# Patient Record
Sex: MALE | Race: WHITE | HISPANIC OR LATINO | Employment: OTHER | URBAN - METROPOLITAN AREA
[De-identification: names, ages, dates, MRNs, and addresses within clinical notes are randomized per-mention and may not be internally consistent; named-entity substitution may affect disease eponyms.]

---

## 2023-04-03 DIAGNOSIS — I10 PRIMARY HYPERTENSION: Primary | ICD-10-CM

## 2023-04-03 RX ORDER — LISINOPRIL AND HYDROCHLOROTHIAZIDE 12.5; 2 MG/1; MG/1
TABLET ORAL
Qty: 90 TABLET | Refills: 0 | Status: SHIPPED | OUTPATIENT
Start: 2023-04-03 | End: 2023-06-29 | Stop reason: SDUPTHER

## 2023-04-03 RX ORDER — LISINOPRIL AND HYDROCHLOROTHIAZIDE 12.5; 2 MG/1; MG/1
TABLET ORAL
COMMUNITY
End: 2023-04-03 | Stop reason: SDUPTHER

## 2023-05-12 DIAGNOSIS — E78.1 HYPERTRIGLYCERIDEMIA: Primary | ICD-10-CM

## 2023-05-12 DIAGNOSIS — I10 PRIMARY HYPERTENSION: ICD-10-CM

## 2023-05-15 RX ORDER — LISINOPRIL AND HYDROCHLOROTHIAZIDE 12.5; 2 MG/1; MG/1
TABLET ORAL
Qty: 90 TABLET | Refills: 0 | OUTPATIENT
Start: 2023-05-15

## 2023-05-15 NOTE — TELEPHONE ENCOUNTER
Medication refused due to failing protocol.    Requested Prescriptions   Pending Prescriptions Disp Refills    lisinopriL-hydrochlorothiazide 20-12.5 mg tablet [Pharmacy Med Name: Lisinopril-hydroCHLOROthiazide Oral Tablet 20-12.5 MG] 90 tablet 0     Sig: TAKE 1 & 1/2 TABLETS BY MOUTH DAILY       ACE Inhibitors Protocol Failed - 5/12/2023 12:24 PM        Failed - Medication not refilled in past 45 days (1.5 months)     Matching medication order placed on 4/3/2023 10:41 AM  Order [id number]: lisinopriL-hydrochlorothiazide 20-12.5 mg tablet  (For orders placed between 3/31/2023 12:04 PM and 5/15/2023 12:04 PM)            Failed - Normal serum creatinine in past 6 months     Creatinine   Date Value Ref Range Status   02/22/2022 1.38 (H) 0.50 - 1.30 mg/dL Final             Failed - Normal serum potassium in past 6 months     Potassium   Date Value Ref Range Status   02/22/2022 3.9 3.5 - 5.3 mmol/L Final             Passed - Visit with relevant provider in past 12 months or upcoming 90 days     Recent Visits  No visits were found meeting these conditions.  Showing recent visits within past 365 days and meeting all other requirements  Future Appointments  Date Type Provider Dept   06/05/23 Appointment Waylon Nogueira DO Do [id number]   Showing future appointments within next 90 days and meeting all other requirements              Passed - Blood pressure on record in past 6 months     BP Readings from Last 3 Encounters:   03/01/23 116/76   11/07/22 154/81   05/24/22 120/72               Passed - No ARBs prescribed in past 30 days       Diuretics Protocol Failed - 5/12/2023 12:24 PM        Failed - Normal serum potassium in past 12 months     Potassium   Date Value Ref Range Status   02/22/2022 3.9 3.5 - 5.3 mmol/L Final             Failed - Normal serum sodium in past 12 months     Sodium   Date Value Ref Range Status   02/22/2022 140 136 - 145 mmol/L Final             Failed - Medication not refilled in past 45 days  (1.5 months)     Matching medication order placed on 4/3/2023 10:41 AM  Order [id number]: lisinopriL-hydrochlorothiazide 20-12.5 mg tablet  (For orders placed between 3/31/2023 12:04 PM and 5/15/2023 12:04 PM)            Failed - Normal serum creatinine in past 12 months     Creatinine   Date Value Ref Range Status   02/22/2022 1.38 (H) 0.50 - 1.30 mg/dL Final             Passed - Visit with relevant provider in past 12 months or upcoming 90 days     Recent Visits  No visits were found meeting these conditions.  Showing recent visits within past 365 days and meeting all other requirements  Future Appointments  Date Type Provider Dept   06/05/23 Appointment Waylon Nogueira DO Do [id number]   Showing future appointments within next 90 days and meeting all other requirements              Passed - Weight on record in past 6 months        Passed - Blood pressure on record     BP Readings from Last 3 Encounters:   03/01/23 116/76   11/07/22 154/81   05/24/22 120/72

## 2023-05-16 PROBLEM — G47.11 IDIOPATHIC HYPERSOMNIA: Status: ACTIVE | Noted: 2023-05-16

## 2023-05-16 PROBLEM — M54.2 NECK PAIN: Status: ACTIVE | Noted: 2023-05-16

## 2023-05-16 PROBLEM — G47.30 SLEEP APNEA: Status: ACTIVE | Noted: 2023-05-16

## 2023-05-16 PROBLEM — Z86.0100 HISTORY OF COLON POLYPS: Status: ACTIVE | Noted: 2023-05-16

## 2023-05-16 PROBLEM — K21.9 GASTROESOPHAGEAL REFLUX DISEASE: Status: ACTIVE | Noted: 2023-05-16

## 2023-05-16 PROBLEM — R13.10 DYSPHAGIA: Status: ACTIVE | Noted: 2023-05-16

## 2023-05-16 PROBLEM — H90.5 SENSORINEURAL HEARING LOSS: Status: ACTIVE | Noted: 2023-05-16

## 2023-05-16 PROBLEM — Z86.010 HISTORY OF COLON POLYPS: Status: ACTIVE | Noted: 2023-05-16

## 2023-05-16 PROBLEM — K22.70 BARRETT ESOPHAGUS: Status: ACTIVE | Noted: 2023-05-16

## 2023-05-16 PROBLEM — H35.362: Status: ACTIVE | Noted: 2023-05-16

## 2023-05-16 PROBLEM — R73.01 IMPAIRED FASTING GLUCOSE: Status: ACTIVE | Noted: 2023-05-16

## 2023-05-16 PROBLEM — N18.31 STAGE 3A CHRONIC KIDNEY DISEASE (MULTI): Status: ACTIVE | Noted: 2023-05-16

## 2023-05-16 PROBLEM — I10 HYPERTENSION: Status: ACTIVE | Noted: 2023-05-16

## 2023-05-16 PROBLEM — E55.9 VITAMIN D DEFICIENCY: Status: ACTIVE | Noted: 2023-05-16

## 2023-05-16 PROBLEM — J44.9 COPD (CHRONIC OBSTRUCTIVE PULMONARY DISEASE) (MULTI): Status: ACTIVE | Noted: 2023-05-16

## 2023-05-16 PROBLEM — N52.9 ERECTILE DYSFUNCTION: Status: ACTIVE | Noted: 2023-05-16

## 2023-05-16 PROBLEM — M10.9 GOUT: Status: ACTIVE | Noted: 2023-05-16

## 2023-05-16 RX ORDER — FENOFIBRATE 160 MG/1
160 TABLET ORAL DAILY
Qty: 90 TABLET | Refills: 0 | Status: SHIPPED | OUTPATIENT
Start: 2023-05-16 | End: 2023-08-07 | Stop reason: SDUPTHER

## 2023-05-16 RX ORDER — FENOFIBRATE 160 MG/1
160 TABLET ORAL DAILY
COMMUNITY
End: 2023-05-16 | Stop reason: SDUPTHER

## 2023-05-24 RX ORDER — COLCHICINE 0.6 MG/1
0.6 TABLET ORAL DAILY
COMMUNITY
End: 2023-09-06 | Stop reason: SDUPTHER

## 2023-05-24 RX ORDER — OMEPRAZOLE 40 MG/1
1 CAPSULE, DELAYED RELEASE ORAL DAILY
COMMUNITY
Start: 2015-07-29 | End: 2023-06-26 | Stop reason: SDUPTHER

## 2023-05-24 RX ORDER — FLUOCINONIDE 0.5 MG/G
OINTMENT TOPICAL DAILY
COMMUNITY
Start: 2023-03-08

## 2023-06-05 ENCOUNTER — APPOINTMENT (OUTPATIENT)
Dept: PRIMARY CARE | Facility: CLINIC | Age: 83
End: 2023-06-05
Payer: MEDICARE

## 2023-06-26 DIAGNOSIS — K21.9 GASTROESOPHAGEAL REFLUX DISEASE WITHOUT ESOPHAGITIS: Primary | ICD-10-CM

## 2023-06-26 RX ORDER — OMEPRAZOLE 40 MG/1
40 CAPSULE, DELAYED RELEASE ORAL DAILY
Qty: 90 CAPSULE | Refills: 0 | Status: SHIPPED | OUTPATIENT
Start: 2023-06-26 | End: 2023-11-07 | Stop reason: SDUPTHER

## 2023-06-29 DIAGNOSIS — I10 PRIMARY HYPERTENSION: ICD-10-CM

## 2023-06-29 RX ORDER — LISINOPRIL AND HYDROCHLOROTHIAZIDE 12.5; 2 MG/1; MG/1
1 TABLET ORAL DAILY
Qty: 30 TABLET | Refills: 0 | Status: SHIPPED | OUTPATIENT
Start: 2023-06-29 | End: 2023-08-02 | Stop reason: SDUPTHER

## 2023-07-10 DIAGNOSIS — I10 PRIMARY HYPERTENSION: ICD-10-CM

## 2023-07-10 RX ORDER — ALBUTEROL SULFATE 90 UG/1
2 AEROSOL, METERED RESPIRATORY (INHALATION) EVERY 4 HOURS PRN
Qty: 18 G | Refills: 0 | Status: SHIPPED | OUTPATIENT
Start: 2023-07-10 | End: 2023-08-07 | Stop reason: SDUPTHER

## 2023-07-25 ENCOUNTER — TELEPHONE (OUTPATIENT)
Dept: PRIMARY CARE | Facility: CLINIC | Age: 83
End: 2023-07-25
Payer: MEDICARE

## 2023-07-25 DIAGNOSIS — Z12.5 PROSTATE CANCER SCREENING: ICD-10-CM

## 2023-07-25 DIAGNOSIS — E55.9 VITAMIN D DEFICIENCY: ICD-10-CM

## 2023-07-25 DIAGNOSIS — R73.01 IMPAIRED FASTING GLUCOSE: ICD-10-CM

## 2023-07-25 DIAGNOSIS — I10 PRIMARY HYPERTENSION: Primary | ICD-10-CM

## 2023-07-25 NOTE — TELEPHONE ENCOUNTER
Patient daughter had called and is requesting for labs to be put in before his next appt, please advise

## 2023-07-31 ENCOUNTER — LAB (OUTPATIENT)
Dept: LAB | Facility: LAB | Age: 83
End: 2023-07-31
Payer: MEDICARE

## 2023-07-31 DIAGNOSIS — E55.9 VITAMIN D DEFICIENCY: ICD-10-CM

## 2023-07-31 DIAGNOSIS — R73.01 IMPAIRED FASTING GLUCOSE: ICD-10-CM

## 2023-07-31 DIAGNOSIS — Z12.5 PROSTATE CANCER SCREENING: ICD-10-CM

## 2023-07-31 DIAGNOSIS — I10 PRIMARY HYPERTENSION: ICD-10-CM

## 2023-07-31 LAB
ALANINE AMINOTRANSFERASE (SGPT) (U/L) IN SER/PLAS: 15 U/L (ref 10–52)
ALBUMIN (G/DL) IN SER/PLAS: 4.1 G/DL (ref 3.4–5)
ALKALINE PHOSPHATASE (U/L) IN SER/PLAS: 51 U/L (ref 33–136)
ANION GAP IN SER/PLAS: 12 MMOL/L (ref 10–20)
ASPARTATE AMINOTRANSFERASE (SGOT) (U/L) IN SER/PLAS: 19 U/L (ref 9–39)
BILIRUBIN TOTAL (MG/DL) IN SER/PLAS: 0.5 MG/DL (ref 0–1.2)
CALCIDIOL (25 OH VITAMIN D3) (NG/ML) IN SER/PLAS: 52 NG/ML
CALCIUM (MG/DL) IN SER/PLAS: 10.1 MG/DL (ref 8.6–10.3)
CARBON DIOXIDE, TOTAL (MMOL/L) IN SER/PLAS: 29 MMOL/L (ref 21–32)
CHLORIDE (MMOL/L) IN SER/PLAS: 104 MMOL/L (ref 98–107)
CHOLESTEROL (MG/DL) IN SER/PLAS: 201 MG/DL (ref 0–199)
CHOLESTEROL IN HDL (MG/DL) IN SER/PLAS: 44 MG/DL
CHOLESTEROL/HDL RATIO: 4.6
CREATININE (MG/DL) IN SER/PLAS: 1.48 MG/DL (ref 0.5–1.3)
ESTIMATED AVERAGE GLUCOSE FOR HBA1C: 117 MG/DL
GFR MALE: 46 ML/MIN/1.73M2
GLUCOSE (MG/DL) IN SER/PLAS: 97 MG/DL (ref 74–99)
HEMOGLOBIN A1C/HEMOGLOBIN TOTAL IN BLOOD: 5.7 %
LDL: 135 MG/DL (ref 0–99)
POTASSIUM (MMOL/L) IN SER/PLAS: 4 MMOL/L (ref 3.5–5.3)
PROSTATE SPECIFIC ANTIGEN,SCREEN: 5.29 NG/ML (ref 0–4)
PROTEIN TOTAL: 7 G/DL (ref 6.4–8.2)
SODIUM (MMOL/L) IN SER/PLAS: 141 MMOL/L (ref 136–145)
TRIGLYCERIDE (MG/DL) IN SER/PLAS: 108 MG/DL (ref 0–149)
UREA NITROGEN (MG/DL) IN SER/PLAS: 28 MG/DL (ref 6–23)
VLDL: 22 MG/DL (ref 0–40)

## 2023-07-31 PROCEDURE — 80061 LIPID PANEL: CPT

## 2023-07-31 PROCEDURE — 36415 COLL VENOUS BLD VENIPUNCTURE: CPT

## 2023-07-31 PROCEDURE — 80053 COMPREHEN METABOLIC PANEL: CPT

## 2023-07-31 PROCEDURE — 82306 VITAMIN D 25 HYDROXY: CPT

## 2023-07-31 PROCEDURE — 83036 HEMOGLOBIN GLYCOSYLATED A1C: CPT

## 2023-07-31 PROCEDURE — G0103 PSA SCREENING: HCPCS

## 2023-08-02 DIAGNOSIS — I10 PRIMARY HYPERTENSION: ICD-10-CM

## 2023-08-02 RX ORDER — LISINOPRIL AND HYDROCHLOROTHIAZIDE 12.5; 2 MG/1; MG/1
1 TABLET ORAL DAILY
Qty: 30 TABLET | Refills: 0 | Status: SHIPPED | OUTPATIENT
Start: 2023-08-02 | End: 2023-08-07 | Stop reason: SDUPTHER

## 2023-08-07 ENCOUNTER — OFFICE VISIT (OUTPATIENT)
Dept: PRIMARY CARE | Facility: CLINIC | Age: 83
End: 2023-08-07
Payer: MEDICARE

## 2023-08-07 VITALS
HEIGHT: 67 IN | SYSTOLIC BLOOD PRESSURE: 128 MMHG | BODY MASS INDEX: 35.44 KG/M2 | OXYGEN SATURATION: 94 % | HEART RATE: 81 BPM | TEMPERATURE: 98.1 F | DIASTOLIC BLOOD PRESSURE: 78 MMHG | WEIGHT: 225.8 LBS

## 2023-08-07 DIAGNOSIS — K21.9 GASTROESOPHAGEAL REFLUX DISEASE WITHOUT ESOPHAGITIS: ICD-10-CM

## 2023-08-07 DIAGNOSIS — E66.01 CLASS 2 SEVERE OBESITY WITH SERIOUS COMORBIDITY AND BODY MASS INDEX (BMI) OF 35.0 TO 35.9 IN ADULT, UNSPECIFIED OBESITY TYPE (MULTI): ICD-10-CM

## 2023-08-07 DIAGNOSIS — G47.33 OBSTRUCTIVE SLEEP APNEA SYNDROME: ICD-10-CM

## 2023-08-07 DIAGNOSIS — E78.1 HYPERTRIGLYCERIDEMIA: ICD-10-CM

## 2023-08-07 DIAGNOSIS — L60.8 TOENAIL DEFORMITY: ICD-10-CM

## 2023-08-07 DIAGNOSIS — I10 PRIMARY HYPERTENSION: Primary | ICD-10-CM

## 2023-08-07 DIAGNOSIS — M1A.09X0 IDIOPATHIC CHRONIC GOUT OF MULTIPLE SITES WITHOUT TOPHUS: ICD-10-CM

## 2023-08-07 DIAGNOSIS — J44.9 CHRONIC OBSTRUCTIVE PULMONARY DISEASE, UNSPECIFIED COPD TYPE (MULTI): ICD-10-CM

## 2023-08-07 DIAGNOSIS — R73.01 IMPAIRED FASTING GLUCOSE: ICD-10-CM

## 2023-08-07 DIAGNOSIS — I12.9 HYPERTENSIVE KIDNEY DISEASE WITH STAGE 3A CHRONIC KIDNEY DISEASE (MULTI): ICD-10-CM

## 2023-08-07 DIAGNOSIS — E55.9 VITAMIN D DEFICIENCY: ICD-10-CM

## 2023-08-07 DIAGNOSIS — N18.31 HYPERTENSIVE KIDNEY DISEASE WITH STAGE 3A CHRONIC KIDNEY DISEASE (MULTI): ICD-10-CM

## 2023-08-07 DIAGNOSIS — Z87.19 HISTORY OF BARRETT'S ESOPHAGUS: ICD-10-CM

## 2023-08-07 DIAGNOSIS — R97.20 INCREASED PROSTATE SPECIFIC ANTIGEN (PSA) VELOCITY: ICD-10-CM

## 2023-08-07 PROBLEM — E66.812 CLASS 2 SEVERE OBESITY WITH SERIOUS COMORBIDITY AND BODY MASS INDEX (BMI) OF 35.0 TO 35.9 IN ADULT: Status: ACTIVE | Noted: 2023-08-07

## 2023-08-07 PROCEDURE — 99214 OFFICE O/P EST MOD 30 MIN: CPT | Performed by: FAMILY MEDICINE

## 2023-08-07 PROCEDURE — 1160F RVW MEDS BY RX/DR IN RCRD: CPT | Performed by: FAMILY MEDICINE

## 2023-08-07 PROCEDURE — 1036F TOBACCO NON-USER: CPT | Performed by: FAMILY MEDICINE

## 2023-08-07 PROCEDURE — 3078F DIAST BP <80 MM HG: CPT | Performed by: FAMILY MEDICINE

## 2023-08-07 PROCEDURE — 1159F MED LIST DOCD IN RCRD: CPT | Performed by: FAMILY MEDICINE

## 2023-08-07 PROCEDURE — 3074F SYST BP LT 130 MM HG: CPT | Performed by: FAMILY MEDICINE

## 2023-08-07 RX ORDER — ALBUTEROL SULFATE 90 UG/1
2 AEROSOL, METERED RESPIRATORY (INHALATION) EVERY 4 HOURS PRN
Qty: 18 G | Refills: 1 | Status: SHIPPED | OUTPATIENT
Start: 2023-08-07 | End: 2023-11-14 | Stop reason: SDUPTHER

## 2023-08-07 RX ORDER — BUDESONIDE AND FORMOTEROL FUMARATE DIHYDRATE 160; 4.5 UG/1; UG/1
2 AEROSOL RESPIRATORY (INHALATION)
Qty: 1 EACH | Refills: 2 | Status: SHIPPED | OUTPATIENT
Start: 2023-08-07 | End: 2024-04-02 | Stop reason: SDUPTHER

## 2023-08-07 RX ORDER — LISINOPRIL AND HYDROCHLOROTHIAZIDE 12.5; 2 MG/1; MG/1
1 TABLET ORAL DAILY
Qty: 90 TABLET | Refills: 0 | Status: SHIPPED | OUTPATIENT
Start: 2023-08-07 | End: 2023-11-07 | Stop reason: SDUPTHER

## 2023-08-07 RX ORDER — FENOFIBRATE 160 MG/1
160 TABLET ORAL DAILY
Qty: 90 TABLET | Refills: 0 | Status: SHIPPED | OUTPATIENT
Start: 2023-08-07 | End: 2023-11-07 | Stop reason: SDUPTHER

## 2023-08-07 ASSESSMENT — PATIENT HEALTH QUESTIONNAIRE - PHQ9
SUM OF ALL RESPONSES TO PHQ9 QUESTIONS 1 AND 2: 0
2. FEELING DOWN, DEPRESSED OR HOPELESS: NOT AT ALL
1. LITTLE INTEREST OR PLEASURE IN DOING THINGS: NOT AT ALL

## 2023-08-07 NOTE — PATIENT INSTRUCTIONS
Follow up in 3 months.    It was a pleasure to see you today. Thank you for choosing us for your health care needs.    If you have lab or other testing completed and have not been informed of results within one week, please call the office for your results.    If you haven't done so, consider signing up for [facility] Pix[employer], the [facility] personal health record. Ask the staff how you can get started.

## 2023-08-07 NOTE — PROGRESS NOTES
Subjective   Patient ID: Shashi Murray is a 83 y.o. male who presents for Follow-up.    HPI     8/7/2023: Pt is overdue for follow up and labs.    Patient and patient's daughter advised that he will be getting new hearing aids.     Patient thinks he may have fungal infections of toenails.  2nd toenail on both feet is getting very thick and yellow.  No pain or swelling.      He has HTN.  Patient is compliant with antihypertensive therapy.   He has been compliant with his antihypertensive therapy and denies any noted side effects.  He does not monitor BP at home. He denies CP, SOB, dizziness, and LE edema.     He has hypertriglyceridemia.   Patient is compliant with his fenofibrate.   He denies any noted side effects.   Patient tries to limit the amount of fatty foods and high cholesterol foods in his diet     Patient has impaired fasting glucose.  IHe denies any polyuria, polydipsia, polyphagia.     Patient has COPD.  COPD has remained stable with Symbicort and ProAir as needed.   He denies any SOB above his baseline.   He has not experienced any exacerbations since his last visit.     Patient has GERD and a hx of Porras's esophagus.  His GERD symptoms well controlled with omeprazole OTC.  His last EGD did not reveal previously noted Porras's esophagus and no follow-up EGD was recommended.     Patient has gout.  He is maintained on daily colchicine for gout.  No exacerbations since his last visit here..     He has vitamin D deficiency.  Patient remains compliant with the current dosing of over the counter vitamin D supplement     Patient has obstructive sleep apnea.   He uses CPAP nightly and continues to benefit from use.    Review of Systems  Constitutional: Patient denies any fever, chills, loss of appetite, or unexplained weight loss.  Cardiovascular: Patient denies any chest pain, shortness of breath with exertion, tachycardia, palpitations, orthopnea, or paroxysmal nocturnal dyspnea.  Respiratory: Patient  "denies any cough, shortness breath, or wheezing.  Gastrointestinal: Patient denies any nausea, vomiting, diarrhea, constipation, melena, hematochezia, or reflux symptoms.  Skin: Denies any rashes or skin lesions.   Neurology: Patient denies any new motor or sensory losses.  Denies any numbness, tingling, weakness, and incoordination of the extremities.  Patient also denies any tremor, seizures, or gait instability.  Endocrinology: Denies any polyuria, polydipsia, polyphagia, or heat/cold intolerance.      Objective   BP (!) 135/92   Pulse 81   Temp 36.7 °C (98.1 °F)   Ht 1.702 m (5' 7\")   Wt 102 kg (225 lb 12.8 oz)   SpO2 94%   BMI 35.37 kg/m²     Physical Exam  General Appearance: Alert and cooperative, in no acute distress, well-developed/well-nourished.  Neck: Supple and without adenopathy or rigidity.  There is no JVD at 90° and no carotid bruits are noted.  There is no thyromegaly, thyroid tenderness, or palpable thyroid nodules.    Heart: Regular rate and rhythm with grade 2/6 murmur RSB. No ectopy.    Respiratory: Lungs are clear to auscultation bilaterally with good air exchange.  Good respiratory effort and no accessory muscle use.    Skin: Good turgor, moist, warm and without rashes or lesions.  THE NAIL OF THE SECOND TOE BILATERALLY IS THICK AND YELLOW.    Neurological exam: Alert and oriented ×3, no tremor, normal gait.  Extremities: No clubbing, cyanosis, or edema      Assessment/Plan     Toenail deformity: We will refer him to podiatry for further evaluation and treatment.    Increased PSA velocity:  His PSA has increased to 5.29 on last labs.  We discussed referring him to neurology versus short-term follow-up on labs.  He would prefer to repeat labs in 6 months.    HTN: Blood pressure appears adequately controlled and we will continue with the current antihypertensive therapy.     Hypertriglyceridemia: Patient will continue with the current medication. Dietary changes, exercise, and maintenance " of healthy weight were discussed at length.     GERD / Hx of Hopson's esophagus: His symptoms well controlled with omeprazole. He underwent an EGD with Dr. Mckee 12/2018 with the biopsy.   He has a history of HOPSON'S ESOPHAGUS that had resolved on last EGD.   NO FURTHER EGD RECOMMENDED BY HIS SPECIALIST (DR. MCKEE)  PT TO CONTINUE ON THE OMEPRAZOLE.     COPD: Stable based on symptoms.   Pt is to continue Symbicort and as needed ProAir.      GOUT: Stable based on symptoms (no recent flares).   Patient denies any recent exacerbations.  Pt will use meloxicam as needed for any exacerbations.     Vitamin D deficiency: Stable based on last labs.  He remains compliant with the current dosing of over the counter vitamin D supplementation and he will continue with the same.     Impaired fasting glucose: His most recent A1c was:  Lab Results   Component Value Date    HGBA1C 5.7 (A) 07/31/2023   Dietary changes, exercise, and maintenance of a healthy weight were discussed at length.   We will continue to monitor.     Sleep Apnea: Stable based on symptoms.  He continues to use his CPAP on a nightly basis for the entire night.  He continues to benefit from the CPAP therapy.     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  We will continue to monitor.   Pt has been advised to avoid NSAIDs.     Obesity: Dietary changes, exercise, and maintenance of a healthy weight were discussed at length.       MCAW due 3/2024  PSA due 8/1/2024    Orders Placed This Encounter   Procedures    Referral to Podiatry     Requested Prescriptions     Signed Prescriptions Disp Refills    fenofibrate (Triglide) 160 mg tablet 90 tablet 0     Sig: Take 1 tablet (160 mg) by mouth once daily.    albuterol 90 mcg/actuation inhaler 18 g 1     Sig: Inhale 2 puffs every 4 hours if needed for shortness of breath.    lisinopriL-hydrochlorothiazide 20-12.5 mg tablet 90 tablet 0     Sig: Take 1 tablet by mouth once daily.    Symbicort 160-4.5 mcg/actuation inhaler  1 each 2     Sig: Inhale 2 puffs 2 times a day.

## 2023-08-30 PROBLEM — R97.20 INCREASED PROSTATE SPECIFIC ANTIGEN (PSA) VELOCITY: Status: ACTIVE | Noted: 2023-08-30

## 2023-08-30 PROBLEM — Z87.19 HISTORY OF BARRETT'S ESOPHAGUS: Status: ACTIVE | Noted: 2023-08-30

## 2023-09-06 DIAGNOSIS — M1A.09X0 IDIOPATHIC CHRONIC GOUT OF MULTIPLE SITES WITHOUT TOPHUS: Primary | ICD-10-CM

## 2023-09-06 RX ORDER — COLCHICINE 0.6 MG/1
0.6 TABLET ORAL DAILY
Qty: 90 TABLET | Refills: 0 | Status: SHIPPED | OUTPATIENT
Start: 2023-09-06 | End: 2023-11-07 | Stop reason: SDUPTHER

## 2023-09-22 PROBLEM — H25.812 COMBINED FORM OF AGE-RELATED CATARACT, LEFT EYE: Status: ACTIVE | Noted: 2023-09-22

## 2023-09-22 PROBLEM — L98.9 SKIN LESION OF UPPER EXTREMITY: Status: ACTIVE | Noted: 2023-09-22

## 2023-09-22 PROBLEM — Z96.1 PSEUDOPHAKIA OF RIGHT EYE: Status: ACTIVE | Noted: 2023-09-22

## 2023-09-22 PROBLEM — E66.811 CLASS 1 OBESITY DUE TO EXCESS CALORIES WITH BODY MASS INDEX (BMI) OF 34.0 TO 34.9 IN ADULT: Status: ACTIVE | Noted: 2023-09-22

## 2023-09-22 PROBLEM — H25.11 AGE-RELATED NUCLEAR CATARACT OF RIGHT EYE: Status: ACTIVE | Noted: 2023-09-22

## 2023-09-22 PROBLEM — E78.1 HYPERTRIGLYCERIDEMIA: Status: ACTIVE | Noted: 2023-09-22

## 2023-09-22 PROBLEM — E66.09 CLASS 1 OBESITY DUE TO EXCESS CALORIES WITH BODY MASS INDEX (BMI) OF 34.0 TO 34.9 IN ADULT: Status: ACTIVE | Noted: 2023-09-22

## 2023-09-22 PROBLEM — H43.819 PVD (POSTERIOR VITREOUS DETACHMENT): Status: ACTIVE | Noted: 2023-09-22

## 2023-09-22 PROBLEM — H11.002 PTERYGIUM OF LEFT EYE: Status: ACTIVE | Noted: 2023-09-22

## 2023-09-22 RX ORDER — COLCHICINE 0.6 MG/1
2 TABLET ORAL DAILY
COMMUNITY

## 2023-09-22 RX ORDER — CHOLECALCIFEROL (VITAMIN D3) 50 MCG
50 TABLET ORAL DAILY
COMMUNITY

## 2023-09-22 RX ORDER — BUDESONIDE AND FORMOTEROL FUMARATE DIHYDRATE 160; 4.5 UG/1; UG/1
2 AEROSOL RESPIRATORY (INHALATION) 2 TIMES DAILY
COMMUNITY
End: 2024-04-02 | Stop reason: WASHOUT

## 2023-10-23 ENCOUNTER — APPOINTMENT (OUTPATIENT)
Dept: OPHTHALMOLOGY | Facility: CLINIC | Age: 83
End: 2023-10-23
Payer: MEDICARE

## 2023-11-07 ENCOUNTER — OFFICE VISIT (OUTPATIENT)
Dept: PRIMARY CARE | Facility: CLINIC | Age: 83
End: 2023-11-07
Payer: MEDICARE

## 2023-11-07 VITALS
DIASTOLIC BLOOD PRESSURE: 77 MMHG | BODY MASS INDEX: 34.69 KG/M2 | TEMPERATURE: 98.1 F | HEART RATE: 78 BPM | WEIGHT: 221 LBS | HEIGHT: 67 IN | SYSTOLIC BLOOD PRESSURE: 119 MMHG | OXYGEN SATURATION: 90 %

## 2023-11-07 DIAGNOSIS — R73.01 IMPAIRED FASTING GLUCOSE: ICD-10-CM

## 2023-11-07 DIAGNOSIS — I10 PRIMARY HYPERTENSION: Primary | ICD-10-CM

## 2023-11-07 DIAGNOSIS — E55.9 VITAMIN D DEFICIENCY: ICD-10-CM

## 2023-11-07 DIAGNOSIS — M1A.09X0 IDIOPATHIC CHRONIC GOUT OF MULTIPLE SITES WITHOUT TOPHUS: ICD-10-CM

## 2023-11-07 DIAGNOSIS — R97.20 INCREASED PROSTATE SPECIFIC ANTIGEN (PSA) VELOCITY: ICD-10-CM

## 2023-11-07 DIAGNOSIS — N18.31 HYPERTENSIVE KIDNEY DISEASE WITH STAGE 3A CHRONIC KIDNEY DISEASE (MULTI): ICD-10-CM

## 2023-11-07 DIAGNOSIS — Z87.19 HISTORY OF BARRETT'S ESOPHAGUS: ICD-10-CM

## 2023-11-07 DIAGNOSIS — K21.9 GASTROESOPHAGEAL REFLUX DISEASE WITHOUT ESOPHAGITIS: ICD-10-CM

## 2023-11-07 DIAGNOSIS — E66.01 CLASS 2 SEVERE OBESITY WITH SERIOUS COMORBIDITY AND BODY MASS INDEX (BMI) OF 35.0 TO 35.9 IN ADULT, UNSPECIFIED OBESITY TYPE (MULTI): ICD-10-CM

## 2023-11-07 DIAGNOSIS — J44.9 CHRONIC OBSTRUCTIVE PULMONARY DISEASE, UNSPECIFIED COPD TYPE (MULTI): ICD-10-CM

## 2023-11-07 DIAGNOSIS — E78.1 HYPERTRIGLYCERIDEMIA: ICD-10-CM

## 2023-11-07 DIAGNOSIS — G47.33 OBSTRUCTIVE SLEEP APNEA SYNDROME: ICD-10-CM

## 2023-11-07 DIAGNOSIS — I12.9 HYPERTENSIVE KIDNEY DISEASE WITH STAGE 3A CHRONIC KIDNEY DISEASE (MULTI): ICD-10-CM

## 2023-11-07 DIAGNOSIS — Z23 NEED FOR IMMUNIZATION AGAINST INFLUENZA: ICD-10-CM

## 2023-11-07 PROCEDURE — G0008 ADMIN INFLUENZA VIRUS VAC: HCPCS | Performed by: FAMILY MEDICINE

## 2023-11-07 PROCEDURE — 90662 IIV NO PRSV INCREASED AG IM: CPT | Performed by: FAMILY MEDICINE

## 2023-11-07 PROCEDURE — 1159F MED LIST DOCD IN RCRD: CPT | Performed by: FAMILY MEDICINE

## 2023-11-07 PROCEDURE — 1160F RVW MEDS BY RX/DR IN RCRD: CPT | Performed by: FAMILY MEDICINE

## 2023-11-07 PROCEDURE — 1036F TOBACCO NON-USER: CPT | Performed by: FAMILY MEDICINE

## 2023-11-07 PROCEDURE — 3078F DIAST BP <80 MM HG: CPT | Performed by: FAMILY MEDICINE

## 2023-11-07 PROCEDURE — 3074F SYST BP LT 130 MM HG: CPT | Performed by: FAMILY MEDICINE

## 2023-11-07 PROCEDURE — 99214 OFFICE O/P EST MOD 30 MIN: CPT | Performed by: FAMILY MEDICINE

## 2023-11-07 RX ORDER — LISINOPRIL AND HYDROCHLOROTHIAZIDE 12.5; 2 MG/1; MG/1
1 TABLET ORAL DAILY
Qty: 90 TABLET | Refills: 0 | Status: SHIPPED | OUTPATIENT
Start: 2023-11-07 | End: 2024-02-26 | Stop reason: SDUPTHER

## 2023-11-07 RX ORDER — COLCHICINE 0.6 MG/1
0.6 TABLET ORAL DAILY
Qty: 90 TABLET | Refills: 0 | Status: SHIPPED | OUTPATIENT
Start: 2023-11-07

## 2023-11-07 RX ORDER — OMEPRAZOLE 40 MG/1
40 CAPSULE, DELAYED RELEASE ORAL DAILY
Qty: 90 CAPSULE | Refills: 0 | Status: SHIPPED | OUTPATIENT
Start: 2023-11-07

## 2023-11-07 RX ORDER — FENOFIBRATE 160 MG/1
160 TABLET ORAL DAILY
Qty: 90 TABLET | Refills: 0 | Status: SHIPPED | OUTPATIENT
Start: 2023-11-07 | End: 2024-02-05 | Stop reason: SDUPTHER

## 2023-11-07 NOTE — PROGRESS NOTES
Subjective   Patient ID: Shashi Murray is a 83 y.o. male who presents for Follow-up.    HPI         He has HTN.  Patient is compliant with antihypertensive therapy.   He has been compliant with his antihypertensive therapy and denies any noted side effects.  He does not monitor BP at home. He denies CP, SOB, dizziness, and LE edema.     He has hypertriglyceridemia.   Patient is compliant with his fenofibrate.   He denies any noted side effects.   Patient tries to limit the amount of fatty foods and high cholesterol foods in his diet     Patient has impaired fasting glucose.  IHe denies any polyuria, polydipsia, polyphagia.     Patient has COPD.  COPD has remained stable with Symbicort and ProAir as needed.   He denies any SOB above his baseline.   He has not experienced any exacerbations since his last visit.     Patient has GERD and a hx of Porras's esophagus.  His GERD symptoms well controlled with omeprazole OTC.  His last EGD did not reveal previously noted Porras's esophagus and no follow-up EGD was recommended.     Patient has gout.  He is maintained on daily colchicine for gout.  No exacerbations since his last visit here..     He has vitamin D deficiency.  Patient remains compliant with the current dosing of over the counter vitamin D supplement     Patient has obstructive sleep apnea.   He uses CPAP nightly and continues to benefit from use.       Review of Systems  Constitutional: Patient denies any fever, chills, loss of appetite, or unexplained weight loss.  Cardiovascular: Patient denies any chest pain, shortness of breath with exertion, tachycardia, palpitations, orthopnea, or paroxysmal nocturnal dyspnea.  Respiratory: Patient denies any cough, shortness breath, or wheezing.  Gastrointestinal: Patient denies any nausea, vomiting, diarrhea, constipation, melena, hematochezia, or reflux symptoms.  Skin: Denies any rashes or skin lesions.   Neurology: Patient denies any new motor or sensory losses.   "Denies any numbness, tingling, weakness, and incoordination of the extremities.  Patient also denies any tremor, seizures, or gait instability.  Endocrinology: Denies any polyuria, polydipsia, polyphagia, or heat/cold intolerance.      Objective   /77   Pulse 78   Temp 36.7 °C (98.1 °F)   Ht 1.702 m (5' 7\")   Wt 100 kg (221 lb)   SpO2 90%   BMI 34.61 kg/m²     Physical Exam  General Appearance: Alert and cooperative, in no acute distress, well-developed/well-nourished male.  Neck: Supple and without adenopathy or rigidity.  There is no JVD at 90° and no carotid bruits are noted.  There is no thyromegaly, thyroid tenderness, or palpable thyroid nodules.  Heart: Regular rate and rhythm without murmur or ectopy.  Respiratory: Lungs are clear to auscultation bilaterally with good air exchange.  Good respiratory effort and no accessory muscle use.  Skin: Good turgor, moist, warm and without rashes or lesions.  Neurological exam: Alert and oriented ×3, no tremor, normal gait.  Extremities: No clubbing, cyanosis, or edema      Assessment/Plan   Increased PSA velocity:  His PSA has increased to 5.29 on last labs.  We discussed referring him to urology versus short-term follow-up on labs.  He prefers to recheck the PSA with his labs in 2/2024 before considering referral to urology.        HTN: Blood pressure appears adequately controlled and we will continue with the current antihypertensive therapy.     Hypertriglyceridemia: Patient will continue with the current medication. Dietary changes, exercise, and maintenance of healthy weight were discussed at length.     GERD / Hx of Hopson's esophagus: His symptoms well controlled with omeprazole. He underwent an EGD with Dr. Mckee 12/2018 with the biopsy.   He has a history of HOPSON'S ESOPHAGUS that had resolved on last EGD.   NO FURTHER EGD RECOMMENDED BY HIS SPECIALIST (DR. MCKEE)  PT TO CONTINUE ON THE OMEPRAZOLE.     COPD: Stable based on symptoms.   Pt is to " continue Symbicort and as needed ProAir.      GOUT: Stable based on symptoms (no recent flares).   Patient denies any recent exacerbations.  Pt will use meloxicam as needed for any exacerbations.     Vitamin D deficiency: Stable based on last labs.  He remains compliant with the current dosing of over the counter vitamin D supplementation and he will continue with the same.     Impaired fasting glucose: His most recent A1c was:  Lab Results   Component Value Date    HGBA1C 5.7 (A) 07/31/2023   Dietary changes, exercise, and maintenance of a healthy weight were discussed at length.   We will continue to monitor.     Sleep Apnea: Stable based on symptoms.  He continues to use his CPAP on a nightly basis for the entire night.  He continues to benefit from the CPAP therapy.       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  We will continue to monitor.   Pt has been advised to avoid NSAIDs.     Obesity: Dietary changes, exercise, and maintenance of a healthy weight were discussed at length.       MCAW due 3/2024  PSA due 2/1/2024 (free and total)          Orders Placed This Encounter   Procedures    Flu vaccine, quadrivalent, high-dose, preservative free, age 65y+ (FLUZONE)    Hemoglobin A1C    Basic Metabolic Panel    Lipid Panel    PSA, total and free     Requested Prescriptions     Signed Prescriptions Disp Refills    fenofibrate (Triglide) 160 mg tablet 90 tablet 0     Sig: Take 1 tablet (160 mg) by mouth once daily.    omeprazole (PriLOSEC) 40 mg DR capsule 90 capsule 0     Sig: Take 1 capsule (40 mg) by mouth once daily.    colchicine 0.6 mg tablet 90 tablet 0     Sig: Take 1 tablet (0.6 mg) by mouth once daily.    lisinopriL-hydrochlorothiazide 20-12.5 mg tablet 90 tablet 0     Sig: Take 1 tablet by mouth once daily.

## 2023-11-07 NOTE — PATIENT INSTRUCTIONS
Contact the CPAP supply regarding trying to get a new mask.  We can send a new order if needed.    Follow up in 3 months with labs to be done PRIOR.    It was a pleasure to see you today. Thank you for choosing us for your health care needs.    If you have lab or other testing completed and have not been informed of results within one week, please call the office for your results.    If you haven't done so, consider signing up for [facility] [email or web address], the [facility] personal health record. Ask the staff how you can get started.

## 2023-11-14 DIAGNOSIS — I10 PRIMARY HYPERTENSION: ICD-10-CM

## 2023-11-14 RX ORDER — ALBUTEROL SULFATE 90 UG/1
2 AEROSOL, METERED RESPIRATORY (INHALATION) EVERY 4 HOURS PRN
Qty: 18 G | Refills: 1 | Status: SHIPPED | OUTPATIENT
Start: 2023-11-14 | End: 2024-03-04 | Stop reason: SDUPTHER

## 2023-12-11 ENCOUNTER — OFFICE VISIT (OUTPATIENT)
Dept: OPHTHALMOLOGY | Facility: CLINIC | Age: 83
End: 2023-12-11
Payer: MEDICARE

## 2023-12-11 DIAGNOSIS — H11.002 PTERYGIUM OF LEFT EYE: ICD-10-CM

## 2023-12-11 DIAGNOSIS — H25.812 COMBINED FORM OF AGE-RELATED CATARACT, LEFT EYE: ICD-10-CM

## 2023-12-11 DIAGNOSIS — H35.363 DRUSEN STAGE MACULAR DEGENERATION OF BOTH EYES: Primary | ICD-10-CM

## 2023-12-11 DIAGNOSIS — Z96.1 PSEUDOPHAKIA OF RIGHT EYE: ICD-10-CM

## 2023-12-11 DIAGNOSIS — H43.813 POSTERIOR VITREOUS DETACHMENT OF BOTH EYES: ICD-10-CM

## 2023-12-11 PROCEDURE — 99214 OFFICE O/P EST MOD 30 MIN: CPT | Performed by: OPHTHALMOLOGY

## 2023-12-11 PROCEDURE — 92134 CPTRZ OPH DX IMG PST SGM RTA: CPT | Mod: BILATERAL PROCEDURE | Performed by: OPHTHALMOLOGY

## 2023-12-11 ASSESSMENT — CUP TO DISC RATIO
OS_RATIO: .3
OD_RATIO: .3

## 2023-12-11 ASSESSMENT — CONF VISUAL FIELD
OS_INFERIOR_TEMPORAL_RESTRICTION: 0
OD_SUPERIOR_NASAL_RESTRICTION: 0
OD_INFERIOR_NASAL_RESTRICTION: 0
OD_NORMAL: 1
OS_SUPERIOR_TEMPORAL_RESTRICTION: 0
OS_SUPERIOR_NASAL_RESTRICTION: 0
OD_SUPERIOR_TEMPORAL_RESTRICTION: 0
OD_INFERIOR_TEMPORAL_RESTRICTION: 0
OS_NORMAL: 1
OS_INFERIOR_NASAL_RESTRICTION: 0

## 2023-12-11 ASSESSMENT — EXTERNAL EXAM - RIGHT EYE: OD_EXAM: NORMAL

## 2023-12-11 ASSESSMENT — REFRACTION_MANIFEST
OS_SPHERE: +1.50
OS_AXIS: 165
OD_SPHERE: +0.25
OD_CYLINDER: -0.50
OD_ADD: +2.75
OS_CYLINDER: -0.50
OD_AXIS: 118

## 2023-12-11 ASSESSMENT — EXTERNAL EXAM - LEFT EYE: OS_EXAM: NORMAL

## 2023-12-11 ASSESSMENT — REFRACTION_WEARINGRX
SPECS_TYPE: LINE BI-FOCAL
OD_SPHERE: +0.50
OD_ADD: +2.75
OS_AXIS: 162
OD_AXIS: 118
OS_SPHERE: +1.50
OS_CYLINDER: -1.00
OD_CYLINDER: -0.50

## 2023-12-11 ASSESSMENT — VISUAL ACUITY
OS_CC: J2-
OS_CC: 20/40-2
OD_CC: J1+
OD_CC: 20/20-2
CORRECTION_TYPE: GLASSES
METHOD: SNELLEN - LINEAR
OS_BAT_HIGH: 20/200

## 2023-12-11 ASSESSMENT — TONOMETRY
IOP_METHOD: TONOPEN
OS_IOP_MMHG: 15
OD_IOP_MMHG: 13

## 2023-12-11 ASSESSMENT — SLIT LAMP EXAM - LIDS
COMMENTS: GOOD POSITION
COMMENTS: GOOD POSITION

## 2023-12-11 ASSESSMENT — ENCOUNTER SYMPTOMS: EYES NEGATIVE: 1

## 2023-12-11 NOTE — PROGRESS NOTES
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

## 2024-02-05 DIAGNOSIS — E78.1 HYPERTRIGLYCERIDEMIA: ICD-10-CM

## 2024-02-05 RX ORDER — FENOFIBRATE 160 MG/1
160 TABLET ORAL DAILY
Qty: 90 TABLET | Refills: 0 | Status: SHIPPED | OUTPATIENT
Start: 2024-02-05 | End: 2024-04-02

## 2024-02-07 ENCOUNTER — APPOINTMENT (OUTPATIENT)
Dept: PRIMARY CARE | Facility: CLINIC | Age: 84
End: 2024-02-07
Payer: MEDICARE

## 2024-02-26 DIAGNOSIS — I10 PRIMARY HYPERTENSION: ICD-10-CM

## 2024-02-26 RX ORDER — LISINOPRIL AND HYDROCHLOROTHIAZIDE 12.5; 2 MG/1; MG/1
1 TABLET ORAL DAILY
Qty: 90 TABLET | Refills: 0 | Status: SHIPPED | OUTPATIENT
Start: 2024-02-26 | End: 2024-03-04 | Stop reason: SDUPTHER

## 2024-02-28 DIAGNOSIS — I10 PRIMARY HYPERTENSION: ICD-10-CM

## 2024-02-28 RX ORDER — LISINOPRIL AND HYDROCHLOROTHIAZIDE 12.5; 2 MG/1; MG/1
1 TABLET ORAL DAILY
Qty: 90 TABLET | Refills: 0 | OUTPATIENT
Start: 2024-02-28

## 2024-03-03 DIAGNOSIS — I10 PRIMARY HYPERTENSION: ICD-10-CM

## 2024-03-04 RX ORDER — LISINOPRIL AND HYDROCHLOROTHIAZIDE 12.5; 2 MG/1; MG/1
1 TABLET ORAL DAILY
Qty: 30 TABLET | Refills: 0 | Status: SHIPPED | OUTPATIENT
Start: 2024-03-04 | End: 2024-04-02 | Stop reason: SDUPTHER

## 2024-03-04 RX ORDER — LISINOPRIL AND HYDROCHLOROTHIAZIDE 12.5; 2 MG/1; MG/1
1 TABLET ORAL DAILY
Qty: 90 TABLET | Refills: 0 | OUTPATIENT
Start: 2024-03-04

## 2024-03-04 RX ORDER — CICLOPIROX 80 MG/ML
SOLUTION TOPICAL
COMMUNITY
Start: 2024-02-29

## 2024-03-04 RX ORDER — ALBUTEROL SULFATE 90 UG/1
2 AEROSOL, METERED RESPIRATORY (INHALATION) EVERY 4 HOURS PRN
Qty: 18 G | Refills: 1 | Status: SHIPPED | OUTPATIENT
Start: 2024-03-04 | End: 2024-04-02 | Stop reason: SDUPTHER

## 2024-03-04 RX ORDER — ALBUTEROL SULFATE 90 UG/1
2 AEROSOL, METERED RESPIRATORY (INHALATION) EVERY 4 HOURS PRN
Qty: 18 G | Refills: 1 | OUTPATIENT
Start: 2024-03-04

## 2024-03-04 NOTE — TELEPHONE ENCOUNTER
Pt needs to be seen for future refills, last appt was 11/23 and he is due for a 3 mo f/up. We will refill for 30 d only

## 2024-03-12 DIAGNOSIS — I10 PRIMARY HYPERTENSION: ICD-10-CM

## 2024-03-12 RX ORDER — BUDESONIDE AND FORMOTEROL FUMARATE DIHYDRATE 160; 4.5 UG/1; UG/1
2 AEROSOL RESPIRATORY (INHALATION) 2 TIMES DAILY
Qty: 10.2 G | Refills: 0 | OUTPATIENT
Start: 2024-03-12

## 2024-03-28 ENCOUNTER — LAB (OUTPATIENT)
Dept: LAB | Facility: LAB | Age: 84
End: 2024-03-28
Payer: MEDICARE

## 2024-03-28 DIAGNOSIS — R73.01 IMPAIRED FASTING GLUCOSE: ICD-10-CM

## 2024-03-28 DIAGNOSIS — I10 PRIMARY HYPERTENSION: ICD-10-CM

## 2024-03-28 DIAGNOSIS — E78.1 HYPERTRIGLYCERIDEMIA: ICD-10-CM

## 2024-03-28 DIAGNOSIS — R97.20 INCREASED PROSTATE SPECIFIC ANTIGEN (PSA) VELOCITY: ICD-10-CM

## 2024-03-28 LAB
ANION GAP SERPL CALC-SCNC: 13 MMOL/L (ref 10–20)
BUN SERPL-MCNC: 26 MG/DL (ref 6–23)
CALCIUM SERPL-MCNC: 10.3 MG/DL (ref 8.6–10.3)
CHLORIDE SERPL-SCNC: 103 MMOL/L (ref 98–107)
CHOLEST SERPL-MCNC: 213 MG/DL (ref 0–199)
CHOLESTEROL/HDL RATIO: 4.5
CO2 SERPL-SCNC: 29 MMOL/L (ref 21–32)
CREAT SERPL-MCNC: 1.37 MG/DL (ref 0.5–1.3)
EGFRCR SERPLBLD CKD-EPI 2021: 51 ML/MIN/1.73M*2
EST. AVERAGE GLUCOSE BLD GHB EST-MCNC: 120 MG/DL
GLUCOSE SERPL-MCNC: 105 MG/DL (ref 74–99)
HBA1C MFR BLD: 5.8 %
HDLC SERPL-MCNC: 47.1 MG/DL
LDLC SERPL CALC-MCNC: 141 MG/DL
NON HDL CHOLESTEROL: 166 MG/DL (ref 0–149)
POTASSIUM SERPL-SCNC: 4 MMOL/L (ref 3.5–5.3)
SODIUM SERPL-SCNC: 141 MMOL/L (ref 136–145)
TRIGL SERPL-MCNC: 123 MG/DL (ref 0–149)
VLDL: 25 MG/DL (ref 0–40)

## 2024-03-28 PROCEDURE — 80061 LIPID PANEL: CPT

## 2024-03-28 PROCEDURE — 84153 ASSAY OF PSA TOTAL: CPT

## 2024-03-28 PROCEDURE — 84154 ASSAY OF PSA FREE: CPT

## 2024-03-28 PROCEDURE — 80048 BASIC METABOLIC PNL TOTAL CA: CPT

## 2024-03-28 PROCEDURE — 83036 HEMOGLOBIN GLYCOSYLATED A1C: CPT

## 2024-03-28 PROCEDURE — 36415 COLL VENOUS BLD VENIPUNCTURE: CPT

## 2024-03-30 LAB
PSA FREE MFR SERPL: 33 %
PSA FREE SERPL-MCNC: 2.1 NG/ML
PSA SERPL IA-MCNC: 6.4 NG/ML (ref 0–4)

## 2024-04-02 ENCOUNTER — OFFICE VISIT (OUTPATIENT)
Dept: PRIMARY CARE | Facility: CLINIC | Age: 84
End: 2024-04-02
Payer: MEDICARE

## 2024-04-02 VITALS
HEIGHT: 67 IN | TEMPERATURE: 97.6 F | BODY MASS INDEX: 34.94 KG/M2 | DIASTOLIC BLOOD PRESSURE: 80 MMHG | SYSTOLIC BLOOD PRESSURE: 130 MMHG | HEART RATE: 83 BPM | WEIGHT: 222.6 LBS | OXYGEN SATURATION: 95 %

## 2024-04-02 DIAGNOSIS — I12.9 HYPERTENSIVE KIDNEY DISEASE WITH STAGE 3A CHRONIC KIDNEY DISEASE (MULTI): ICD-10-CM

## 2024-04-02 DIAGNOSIS — N18.31 HYPERTENSIVE KIDNEY DISEASE WITH STAGE 3A CHRONIC KIDNEY DISEASE (MULTI): ICD-10-CM

## 2024-04-02 DIAGNOSIS — I10 PRIMARY HYPERTENSION: Primary | ICD-10-CM

## 2024-04-02 DIAGNOSIS — R73.01 IMPAIRED FASTING GLUCOSE: ICD-10-CM

## 2024-04-02 DIAGNOSIS — K21.9 GASTROESOPHAGEAL REFLUX DISEASE WITHOUT ESOPHAGITIS: ICD-10-CM

## 2024-04-02 DIAGNOSIS — E78.2 MIXED HYPERLIPIDEMIA: ICD-10-CM

## 2024-04-02 DIAGNOSIS — J44.9 CHRONIC OBSTRUCTIVE PULMONARY DISEASE, UNSPECIFIED COPD TYPE (MULTI): ICD-10-CM

## 2024-04-02 DIAGNOSIS — R97.20 INCREASED PROSTATE SPECIFIC ANTIGEN (PSA) VELOCITY: ICD-10-CM

## 2024-04-02 DIAGNOSIS — E55.9 VITAMIN D DEFICIENCY: ICD-10-CM

## 2024-04-02 DIAGNOSIS — M1A.09X0 IDIOPATHIC CHRONIC GOUT OF MULTIPLE SITES WITHOUT TOPHUS: ICD-10-CM

## 2024-04-02 DIAGNOSIS — E66.9 CLASS 1 OBESITY WITH SERIOUS COMORBIDITY AND BODY MASS INDEX (BMI) OF 34.0 TO 34.9 IN ADULT, UNSPECIFIED OBESITY TYPE: ICD-10-CM

## 2024-04-02 DIAGNOSIS — G47.33 OBSTRUCTIVE SLEEP APNEA SYNDROME: ICD-10-CM

## 2024-04-02 DIAGNOSIS — Z87.19 HISTORY OF BARRETT'S ESOPHAGUS: ICD-10-CM

## 2024-04-02 PROBLEM — E66.01 CLASS 2 SEVERE OBESITY WITH SERIOUS COMORBIDITY AND BODY MASS INDEX (BMI) OF 35.0 TO 35.9 IN ADULT (MULTI): Status: RESOLVED | Noted: 2023-08-07 | Resolved: 2024-04-02

## 2024-04-02 PROBLEM — E66.812 CLASS 2 SEVERE OBESITY WITH SERIOUS COMORBIDITY AND BODY MASS INDEX (BMI) OF 35.0 TO 35.9 IN ADULT: Status: RESOLVED | Noted: 2023-08-07 | Resolved: 2024-04-02

## 2024-04-02 PROCEDURE — 1160F RVW MEDS BY RX/DR IN RCRD: CPT | Performed by: FAMILY MEDICINE

## 2024-04-02 PROCEDURE — 3075F SYST BP GE 130 - 139MM HG: CPT | Performed by: FAMILY MEDICINE

## 2024-04-02 PROCEDURE — 3079F DIAST BP 80-89 MM HG: CPT | Performed by: FAMILY MEDICINE

## 2024-04-02 PROCEDURE — 99214 OFFICE O/P EST MOD 30 MIN: CPT | Performed by: FAMILY MEDICINE

## 2024-04-02 PROCEDURE — 1159F MED LIST DOCD IN RCRD: CPT | Performed by: FAMILY MEDICINE

## 2024-04-02 RX ORDER — BUDESONIDE AND FORMOTEROL FUMARATE DIHYDRATE 160; 4.5 UG/1; UG/1
2 AEROSOL RESPIRATORY (INHALATION) 2 TIMES DAILY
Qty: 10.2 G | Refills: 0 | Status: CANCELLED | OUTPATIENT
Start: 2024-04-02

## 2024-04-02 RX ORDER — LISINOPRIL AND HYDROCHLOROTHIAZIDE 12.5; 2 MG/1; MG/1
1 TABLET ORAL DAILY
Qty: 100 TABLET | Refills: 0 | Status: SHIPPED | OUTPATIENT
Start: 2024-04-02 | End: 2024-07-11

## 2024-04-02 RX ORDER — ALBUTEROL SULFATE 90 UG/1
2 AEROSOL, METERED RESPIRATORY (INHALATION) EVERY 4 HOURS PRN
Qty: 18 G | Refills: 1 | Status: SHIPPED | OUTPATIENT
Start: 2024-04-02

## 2024-04-02 RX ORDER — BUDESONIDE AND FORMOTEROL FUMARATE DIHYDRATE 160; 4.5 UG/1; UG/1
2 AEROSOL RESPIRATORY (INHALATION)
Qty: 10.2 G | Refills: 2 | Status: SHIPPED | OUTPATIENT
Start: 2024-04-02

## 2024-04-02 RX ORDER — FENOFIBRATE 160 MG/1
160 TABLET ORAL DAILY
Qty: 90 TABLET | Refills: 0 | Status: CANCELLED | OUTPATIENT
Start: 2024-04-02

## 2024-04-02 RX ORDER — ATORVASTATIN CALCIUM 20 MG/1
20 TABLET, FILM COATED ORAL DAILY
Qty: 90 TABLET | Refills: 0 | Status: SHIPPED | OUTPATIENT
Start: 2024-04-02

## 2024-04-02 ASSESSMENT — PATIENT HEALTH QUESTIONNAIRE - PHQ9
2. FEELING DOWN, DEPRESSED OR HOPELESS: NOT AT ALL
SUM OF ALL RESPONSES TO PHQ9 QUESTIONS 1 AND 2: 0
1. LITTLE INTEREST OR PLEASURE IN DOING THINGS: NOT AT ALL

## 2024-04-02 NOTE — PROGRESS NOTES
Subjective   Patient ID: Shashi Murray is a 84 y.o. male who presents for Follow-up.    HPI     NO NEW CONCERNS   NEEDS ORDER FOR CPAP SUPPLIES   Labs: 03/28/2024  Colonoscopy: 2018      He has HTN.  Patient is compliant with antihypertensive therapy.   He has been compliant with his antihypertensive therapy and denies any noted side effects.  He does not monitor BP at home. He denies CP, SOB, dizziness, and LE edema.     He has hypertriglyceridemia.   Patient is compliant with his fenofibrate.   He denies any noted side effects.   Patient tries to limit the amount of fatty foods and high cholesterol foods in his diet     Patient has impaired fasting glucose.  IHe denies any polyuria, polydipsia, polyphagia.     Patient has COPD.  COPD has remained stable with Symbicort and ProAir as needed.   He denies any SOB above his baseline.   He has not experienced any exacerbations since his last visit.     Patient has GERD and a hx of Porras's esophagus.  His GERD symptoms well controlled with omeprazole OTC.  His last EGD did not reveal previously noted Porras's esophagus and no follow-up EGD was recommended.     Patient has gout.  He is maintained on daily colchicine for gout.  No exacerbations since his last visit here..     He has vitamin D deficiency.  Patient remains compliant with the current dosing of over the counter vitamin D supplement     Patient has obstructive sleep apnea.   He uses CPAP nightly and continues to benefit from use.         Review of Systems  Constitutional: Patient denies any fever, chills, loss of appetite, or unexplained weight loss.  Cardiovascular: Patient denies any chest pain, shortness of breath with exertion, tachycardia, palpitations, orthopnea, or paroxysmal nocturnal dyspnea.  Respiratory: Patient denies any cough, shortness breath, or wheezing.  Gastrointestinal: Patient denies any nausea, vomiting, diarrhea, constipation, melena, hematochezia, or reflux symptoms.  Skin: Denies  "any rashes or skin lesions.   Neurology: Patient denies any new motor or sensory losses.  Denies any numbness, tingling, weakness, and incoordination of the extremities.  Patient also denies any tremor, seizures, or gait instability.  Endocrinology: Denies any polyuria, polydipsia, polyphagia, or heat/cold intolerance.      Objective   /80   Pulse 83   Temp 36.4 °C (97.6 °F)   Ht 1.702 m (5' 7\")   Wt 101 kg (222 lb 9.6 oz)   SpO2 95%   BMI 34.86 kg/m²     Physical Exam  General Appearance: Alert and cooperative, in no acute distress, well-developed/well-nourished.  Neck: Supple and without adenopathy or rigidity.  There is no JVD at 90° and no carotid bruits are noted.  There is no thyromegaly, thyroid tenderness, or palpable thyroid nodules.    Heart: Regular rate and rhythm with 1/6 murmur at RSB and no noted ectopy.    Respiratory: Lungs are clear to auscultation bilaterally with good air exchange.  Good respiratory effort and no accessory muscle use.  Skin: Good turgor, moist, warm and without rashes or lesions.  Neurological exam: Alert and oriented ×3, no tremor, normal gait.  Extremities: No clubbing, cyanosis, or edema      Assessment/Plan      Increased PSA velocity:    Lab Results   Component Value Date    PSA 6.4 (H) 03/28/2024   PSA has increased--->3.9, 5.29, 6.4  We will refer to urology for evaluation.         HTN: Blood pressure appears adequately controlled and we will continue with the current antihypertensive therapy.     Hyperlipidemia: Patient will continue with the current medication. Dietary changes, exercise, and maintenance of healthy weight were discussed at length.     GERD / Hx of Hopson's esophagus: His symptoms well controlled with omeprazole.   He has a history of HOPSON'S ESOPHAGUS that had resolved on last EGD.   NO FURTHER EGD RECOMMENDED BY HIS SPECIALIST (DR. GUPTA)  PT TO CONTINUE ON THE OMEPRAZOLE.     COPD: Stable based on symptoms.   Pt is to continue Symbicort " and as needed ProAir.      GOUT: Stable based on symptoms (no recent flares).   Patient denies any recent exacerbations.  Pt will use meloxicam as needed for any exacerbations.     Vitamin D deficiency: Stable based on last labs.  He remains compliant with the current dosing of over the counter vitamin D supplementation and he will continue with the same.     Impaired fasting glucose: His most recent A1c was:  Lab Results   Component Value Date    HGBA1C 5.8 (H) 03/28/2024   Dietary changes, exercise, and maintenance of a healthy weight were discussed at length.   We will continue to monitor.     Sleep Apnea: Stable based on symptoms.  He continues to use his CPAP on a nightly basis for the entire night.  He continues to benefit from the CPAP therapy.     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  We will continue to monitor.   Pt has been advised to avoid NSAIDs.     Obesity: Dietary changes, exercise, and maintenance of a healthy weight were discussed at length.        MCAW due 3/2024  PSA screen due  8/1/2024        Orders Placed This Encounter   Procedures    Referral to Urology     Requested Prescriptions     Signed Prescriptions Disp Refills    lisinopriL-hydrochlorothiazide 20-12.5 mg tablet 100 tablet 0     Sig: Take 1 tablet by mouth once daily.    albuterol 90 mcg/actuation inhaler 18 g 1     Sig: Inhale 2 puffs every 4 hours if needed for shortness of breath.    atorvastatin (Lipitor) 20 mg tablet 90 tablet 0     Sig: Take 1 tablet (20 mg) by mouth once daily. REPLACES THE FENOFIBRATE    budesonide-formoteroL (Symbicort) 160-4.5 mcg/actuation inhaler 10.2 g 2     Sig: Inhale 2 puffs 2 times a day.

## 2024-04-02 NOTE — PATIENT INSTRUCTIONS
Follow up in 3 months.    We are expecting to start seeing patients at our new location on 5/1/2024.  Our new address will be:  [address] [place] [id number]     It was a pleasure to see you today. Thank you for choosing us for your health care needs.    If you have lab or other testing completed and have not been informed of results within one week, please call the office for your results.    If you haven't done so, consider signing up for [facility] DNA SE[employer], the [facility] personal health record. Ask the staff how you can get started.

## 2024-06-17 DIAGNOSIS — M1A.09X0 IDIOPATHIC CHRONIC GOUT OF MULTIPLE SITES WITHOUT TOPHUS: Primary | ICD-10-CM

## 2024-06-18 RX ORDER — COLCHICINE 0.6 MG/1
1.2 TABLET ORAL DAILY
Qty: 30 TABLET | Refills: 0 | Status: SHIPPED | OUTPATIENT
Start: 2024-06-18

## 2024-06-24 DIAGNOSIS — E78.2 MIXED HYPERLIPIDEMIA: ICD-10-CM

## 2024-06-24 RX ORDER — ATORVASTATIN CALCIUM 20 MG/1
20 TABLET, FILM COATED ORAL DAILY
Qty: 90 TABLET | Refills: 0 | Status: SHIPPED | OUTPATIENT
Start: 2024-06-24

## 2024-07-01 DIAGNOSIS — I10 PRIMARY HYPERTENSION: ICD-10-CM

## 2024-07-01 RX ORDER — LISINOPRIL AND HYDROCHLOROTHIAZIDE 12.5; 2 MG/1; MG/1
1 TABLET ORAL DAILY
Qty: 100 TABLET | Refills: 0 | Status: SHIPPED | OUTPATIENT
Start: 2024-07-01 | End: 2024-07-02 | Stop reason: SDUPTHER

## 2024-07-02 ENCOUNTER — APPOINTMENT (OUTPATIENT)
Dept: PRIMARY CARE | Facility: CLINIC | Age: 84
End: 2024-07-02
Payer: MEDICARE

## 2024-07-02 VITALS
WEIGHT: 230.6 LBS | BODY MASS INDEX: 36.19 KG/M2 | HEIGHT: 67 IN | DIASTOLIC BLOOD PRESSURE: 86 MMHG | OXYGEN SATURATION: 91 % | SYSTOLIC BLOOD PRESSURE: 136 MMHG | HEART RATE: 83 BPM | TEMPERATURE: 96.8 F

## 2024-07-02 DIAGNOSIS — J44.9 CHRONIC OBSTRUCTIVE PULMONARY DISEASE, UNSPECIFIED COPD TYPE (MULTI): ICD-10-CM

## 2024-07-02 DIAGNOSIS — E55.9 VITAMIN D DEFICIENCY: ICD-10-CM

## 2024-07-02 DIAGNOSIS — Z00.00 MEDICARE ANNUAL WELLNESS VISIT, SUBSEQUENT: Primary | ICD-10-CM

## 2024-07-02 DIAGNOSIS — E66.01 CLASS 2 SEVERE OBESITY DUE TO EXCESS CALORIES WITH SERIOUS COMORBIDITY AND BODY MASS INDEX (BMI) OF 36.0 TO 36.9 IN ADULT (MULTI): ICD-10-CM

## 2024-07-02 DIAGNOSIS — G47.33 OBSTRUCTIVE SLEEP APNEA SYNDROME: ICD-10-CM

## 2024-07-02 DIAGNOSIS — Z00.00 WELL ADULT EXAM: ICD-10-CM

## 2024-07-02 DIAGNOSIS — H90.3 SENSORINEURAL HEARING LOSS (SNHL) OF BOTH EARS: ICD-10-CM

## 2024-07-02 DIAGNOSIS — I10 PRIMARY HYPERTENSION: ICD-10-CM

## 2024-07-02 DIAGNOSIS — I12.9 HYPERTENSIVE KIDNEY DISEASE WITH STAGE 3A CHRONIC KIDNEY DISEASE (MULTI): ICD-10-CM

## 2024-07-02 DIAGNOSIS — N18.31 HYPERTENSIVE KIDNEY DISEASE WITH STAGE 3A CHRONIC KIDNEY DISEASE (MULTI): ICD-10-CM

## 2024-07-02 DIAGNOSIS — E78.2 MIXED HYPERLIPIDEMIA: ICD-10-CM

## 2024-07-02 DIAGNOSIS — K21.9 GASTROESOPHAGEAL REFLUX DISEASE WITHOUT ESOPHAGITIS: ICD-10-CM

## 2024-07-02 DIAGNOSIS — Z87.19 HISTORY OF BARRETT'S ESOPHAGUS: ICD-10-CM

## 2024-07-02 DIAGNOSIS — M1A.09X0 IDIOPATHIC CHRONIC GOUT OF MULTIPLE SITES WITHOUT TOPHUS: ICD-10-CM

## 2024-07-02 DIAGNOSIS — R73.01 IMPAIRED FASTING GLUCOSE: ICD-10-CM

## 2024-07-02 DIAGNOSIS — M62.838 MUSCLE SPASM: ICD-10-CM

## 2024-07-02 DIAGNOSIS — R97.20 INCREASED PROSTATE SPECIFIC ANTIGEN (PSA) VELOCITY: ICD-10-CM

## 2024-07-02 PROBLEM — E66.812 CLASS 2 SEVERE OBESITY DUE TO EXCESS CALORIES WITH SERIOUS COMORBIDITY AND BODY MASS INDEX (BMI) OF 36.0 TO 36.9 IN ADULT: Status: ACTIVE | Noted: 2023-09-22

## 2024-07-02 PROCEDURE — 99214 OFFICE O/P EST MOD 30 MIN: CPT | Performed by: FAMILY MEDICINE

## 2024-07-02 PROCEDURE — 1170F FXNL STATUS ASSESSED: CPT | Performed by: FAMILY MEDICINE

## 2024-07-02 PROCEDURE — G0439 PPPS, SUBSEQ VISIT: HCPCS | Performed by: FAMILY MEDICINE

## 2024-07-02 PROCEDURE — 1159F MED LIST DOCD IN RCRD: CPT | Performed by: FAMILY MEDICINE

## 2024-07-02 PROCEDURE — 1160F RVW MEDS BY RX/DR IN RCRD: CPT | Performed by: FAMILY MEDICINE

## 2024-07-02 PROCEDURE — 3075F SYST BP GE 130 - 139MM HG: CPT | Performed by: FAMILY MEDICINE

## 2024-07-02 PROCEDURE — 1124F ACP DISCUSS-NO DSCNMKR DOCD: CPT | Performed by: FAMILY MEDICINE

## 2024-07-02 PROCEDURE — 3079F DIAST BP 80-89 MM HG: CPT | Performed by: FAMILY MEDICINE

## 2024-07-02 PROCEDURE — 99397 PER PM REEVAL EST PAT 65+ YR: CPT | Performed by: FAMILY MEDICINE

## 2024-07-02 RX ORDER — LISINOPRIL AND HYDROCHLOROTHIAZIDE 12.5; 2 MG/1; MG/1
1 TABLET ORAL DAILY
Qty: 100 TABLET | Refills: 0 | Status: SHIPPED | OUTPATIENT
Start: 2024-07-02 | End: 2024-10-10

## 2024-07-02 ASSESSMENT — ACTIVITIES OF DAILY LIVING (ADL)
DRESSING: INDEPENDENT
MANAGING_FINANCES: INDEPENDENT
GROCERY_SHOPPING: INDEPENDENT
TAKING_MEDICATION: INDEPENDENT
BATHING: INDEPENDENT
DOING_HOUSEWORK: INDEPENDENT

## 2024-07-02 NOTE — PATIENT INSTRUCTIONS
Follow up in 3 months with labs to be done PRIOR.    It was a pleasure to see you today. Thank you for choosing us for your health care needs.    If you have lab or other testing completed and have not been informed of results within one week, please call the office for your results.    If you haven't done so, consider signing up for [facility] [employer], the [facility] personal health record. Ask the staff how you can get started.

## 2024-07-02 NOTE — PROGRESS NOTES
Subjective   Reason for Visit: Shashi Murray is an 84 y.o. male here for a Follow up, annual physical, and a  Medicare Wellness visit.     Past Medical, Surgical, and Family History reviewed and updated in chart.         HPI    WILL SEE UROLOGIST FOR ELEVATED PSA 7/11/2024   WILL LIKE TO SEE THE AUDIOLOGIST FOR HEARING AID   He c/o of hands cramping up    NO RECENT BW  colonoscopy: 2018        He has HTN.  Patient is compliant with antihypertensive therapy.   He has been compliant with his antihypertensive therapy and denies any noted side effects.  He does not monitor BP at home. He denies CP, SOB, dizziness, and LE edema.     He has hypertriglyceridemia.   Patient is compliant with his fenofibrate.   He denies any noted side effects.   Patient tries to limit the amount of fatty foods and high cholesterol foods in his diet     Patient has impaired fasting glucose.  IHe denies any polyuria, polydipsia, polyphagia.     Patient has COPD.  COPD has remained stable with Symbicort and ProAir as needed.   He denies any SOB above his baseline.   He has not experienced any exacerbations since his last visit.     Patient has GERD and a hx of Porras's esophagus.  His GERD symptoms well controlled with omeprazole OTC.  His last EGD did not reveal previously noted Porras's esophagus and no follow-up EGD was recommended.     Patient has gout.  He is maintained on daily colchicine for gout.  No exacerbations since his last visit here..     He has vitamin D deficiency.  Patient remains compliant with the current dosing of over the counter vitamin D supplement     Patient has obstructive sleep apnea.   He uses CPAP nightly and continues to benefit from use.           Patient Self Assessment of Health Status  Patient Self Assessment: Fair    Nutrition and Exercise  Current Diet: Well Balanced Diet  Adequate Fluid Intake: No  Caffeine: Yes  Exercise Frequency: Infrequently    Functional Ability/Level of Safety  Cognitive  "Impairment Observed: No cognitive impairment observed    Home Safety Risk Factors: None    Patient Care Team:  Waylon Nogueira DO as PCP - General  Waylon Nogueira DO as PCP - [facility] [employer] PCP     Review of Systems  Constitutional: Patient denies any fever, chills, loss of appetite, or unexplained weight loss.  HEENT: Denies any headache, sore throat, eye pain, ear pain, decreased vision, or decreased hearing. Patient also denies any rhinorrhea.  Cardiovascular: Patient denies any chest pain, shortness of breath with exertion, tachycardia, palpitations, orthopnea, or paroxysmal nocturnal dyspnea.  Respiratory: Patient denies any cough, shortness breath, or wheezing.  Gastrointestinal patient denies any nausea, vomiting, diarrhea, constipation, melena, hematochezia, or reflux symptoms    Musculoskeletal: Patient denies any myalgia, arthralgia, joint swelling, or joint deformity  Positive for muscle cramps.     Skin: Denies any rashes or skin lesions  Neurology: Patient denies any new motor or sensory losses. Denies any numbness, tingling, weakness, and incoordination of the extremities. Patient also denies any tremor, seizures, or gait instability.  Endocrinology: Denies any polyuria, polydipsia, polyphagia, or heat/cold intolerance.  Psychiatric: Denies any anxiety, depression, or suicidal/homicidal ideation.  Hematology: Patient denies any abnormal bruising or bleeding.    Objective   Vitals:  /86   Pulse 83   Temp 36 °C (96.8 °F)   Ht 1.702 m (5' 7\")   Wt 105 kg (230 lb 9.6 oz)   SpO2 91%   BMI 36.12 kg/m²       Physical Exam  Gen. Appearance: Alert and cooperative, no acute distress, well-developed/well-nourished, obese male.    Head: Normocephalic and atraumatic  EENT: Pupils are equal round reactive to light, extraocular muscles are intact, mucous membranes are moist, external auditory canals and tympanic membranes are within normal limits bilaterally, pharynx is without erythema " or exudate, there is no noted rhinorrhea.  Neck: Supple and without adenopathy, no JVD at 90° and no carotid bruits are noted. There is no thyromegaly, thyroid tenderness, or palpable thyroid nodules.  Cardiovascular: Regular rate and rhythm without murmur or ectopy.  Respiratory: Clear to auscultation bilaterally with good air exchange.  Abdomen: Soft, nontender/nondistended. No masses, guarding, rebound, or rigidity. No hepatosplenomegaly, abdominal bruits, or CVA tenderness. Bowel sounds are normal. There is no widening of the aortic pulsation.  Musculoskeletal: Patient has good range of motion of the shoulders, elbows, wrists, hips, knees. There are no noted joint effusions or deformities.  Skin: Good turgor, moist, warm and without rashes or lesions.  Lymph nodes: No cervical, clavicular, or inguinal adenopathy.  Neurological exam: Alert and oriented ×3, no tremor, normal gait.  Psychiatric: Appropriate mood and affect, good insight and judgment, no delusions or thought disorders, no suicidal or homicidal ideation.  Extremities: No clubbing, cyanosis, or edema    Assessment/Plan     MEDICARE ANNUAL WELLNESS EXAM / ANNUAL PHYSICAL: Appropriate screenings for the patient's current age were discussed at length.  I encouraged a low-fat/low-cholesterol diet and routine exercise.           HTN: Blood pressure appears adequately controlled and we will continue with the current antihypertensive therapy.     Hyperlipidemia: Patient will continue with the current medication. Dietary changes, exercise, and maintenance of healthy weight were discussed at length.     GERD / Hx of Hopson's esophagus: His symptoms well controlled with omeprazole.   He has a history of HOPSON'S ESOPHAGUS that had resolved on last EGD.   NO FURTHER EGD RECOMMENDED BY HIS SPECIALIST (DR. GUPTA)  PT TO CONTINUE ON THE OMEPRAZOLE.     COPD: Stable based on symptoms.   Pt is to continue Symbicort and as needed ProAir.      GOUT: Stable based on  symptoms (no recent flares).   Patient denies any recent exacerbations.  Pt will use meloxicam as needed for any exacerbations.     Vitamin D deficiency: Stable based on last labs.  He remains compliant with the current dosing of over the counter vitamin D supplementation and he will continue with the same.     Impaired fasting glucose:  Stable based on labs.  His most recent A1c was:  Lab Results   Component Value Date    HGBA1C 5.8 (H) 03/28/2024   Dietary changes, exercise, and maintenance of a healthy weight were discussed at length.   We will continue to monitor.     Sleep Apnea: Stable based on symptoms.  He continues to use his CPAP on a nightly basis for well over 4 hours per night.  He continues to benefit from the CPAP therapy.     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  We will continue to monitor.   Pt has been advised to avoid NSAIDs.     Obesity: Dietary changes, exercise, and maintenance of a healthy weight were discussed at length.     Increased PSA velocity:    Lab Results  Component Value Date    PSA 6.4 (H) 03/28/2024  PSA has increased--->3.9, 5.29, 6.4  We referred to urology for evaluation and he has appt with Dr. Donato 7/11/2024.    Muscle spasms:  Recommended he take OTC magnesium and do stretches.     Sensorineural hearing loss of both ears:  Will refer him to Audiology for hearing aid check.     Recommended Shingrix vaccine   Recommended RSV vaccine in the Fall    MCAW DUE 7/2025    Follow up in 3 months.      Scribe Attestation  By signing my name below, IRenetta Scribe   attest that this documentation has been prepared under the direction and in the presence of Waylon Nogueira DO.    Orders Placed This Encounter   Procedures    Comprehensive Metabolic Panel    Lipid Panel    Hemoglobin A1C    Referral to Audiology     Requested Prescriptions     Signed Prescriptions Disp Refills    lisinopriL-hydrochlorothiazide 20-12.5 mg tablet 100 tablet 0     Sig: Take 1 tablet by mouth  once daily.

## 2024-07-11 ENCOUNTER — OFFICE VISIT (OUTPATIENT)
Dept: UROLOGY | Facility: CLINIC | Age: 84
End: 2024-07-11
Payer: COMMERCIAL

## 2024-07-11 VITALS
BODY MASS INDEX: 35.99 KG/M2 | HEIGHT: 67 IN | SYSTOLIC BLOOD PRESSURE: 133 MMHG | WEIGHT: 229.28 LBS | RESPIRATION RATE: 16 BRPM | HEART RATE: 75 BPM | DIASTOLIC BLOOD PRESSURE: 83 MMHG

## 2024-07-11 DIAGNOSIS — N13.8 BPH WITH OBSTRUCTION/LOWER URINARY TRACT SYMPTOMS: ICD-10-CM

## 2024-07-11 DIAGNOSIS — R31.0 GROSS HEMATURIA: Primary | ICD-10-CM

## 2024-07-11 DIAGNOSIS — N40.1 BPH WITH OBSTRUCTION/LOWER URINARY TRACT SYMPTOMS: ICD-10-CM

## 2024-07-11 DIAGNOSIS — R97.20 INCREASED PROSTATE SPECIFIC ANTIGEN (PSA) VELOCITY: ICD-10-CM

## 2024-07-11 PROCEDURE — 1036F TOBACCO NON-USER: CPT | Performed by: UROLOGY

## 2024-07-11 PROCEDURE — G2211 COMPLEX E/M VISIT ADD ON: HCPCS | Performed by: UROLOGY

## 2024-07-11 PROCEDURE — 1160F RVW MEDS BY RX/DR IN RCRD: CPT | Performed by: UROLOGY

## 2024-07-11 PROCEDURE — 3075F SYST BP GE 130 - 139MM HG: CPT | Performed by: UROLOGY

## 2024-07-11 PROCEDURE — 99204 OFFICE O/P NEW MOD 45 MIN: CPT | Performed by: UROLOGY

## 2024-07-11 PROCEDURE — 1126F AMNT PAIN NOTED NONE PRSNT: CPT | Performed by: UROLOGY

## 2024-07-11 PROCEDURE — 1159F MED LIST DOCD IN RCRD: CPT | Performed by: UROLOGY

## 2024-07-11 PROCEDURE — 3079F DIAST BP 80-89 MM HG: CPT | Performed by: UROLOGY

## 2024-07-11 PROCEDURE — 99214 OFFICE O/P EST MOD 30 MIN: CPT | Performed by: UROLOGY

## 2024-07-11 RX ORDER — FINASTERIDE 5 MG/1
5 TABLET, FILM COATED ORAL DAILY
Qty: 90 TABLET | Refills: 3 | Status: SHIPPED | OUTPATIENT
Start: 2024-07-11 | End: 2025-07-11

## 2024-07-11 ASSESSMENT — PAIN SCALES - GENERAL: PAINLEVEL: 0-NO PAIN

## 2024-07-11 NOTE — PROGRESS NOTES
History Of Present Illness  84-year-old male here to see me regarding rising PSA and gross hematuria  PMH: Hypertension, hyperlipidemia, hard of hearing, CKD 3 AA, COPD  Fhx: Father liver ca, 2 brothers but not prostate or breast ca  PSA history:  03/24: 6.4, 33% free  07/23: 5.29    Pt had gross hematuria x 2. Pt has LUTS w/ straining, hesitancy, weak stream. Urgency.   Denies: incontinence, severe frequency  NTF 2-3x    Past Medical History  He has a past medical history of Benign prostatic hyperplasia without lower urinary tract symptoms, Chronic obstructive pulmonary disease, unspecified (Multi) (11/07/2022), Elevated prostate specific antigen (PSA), Essential (primary) hypertension (11/07/2022), Personal history of other diseases of the digestive system (11/30/2022), and Pure hyperglyceridemia (11/07/2022).    Surgical History  He has a past surgical history that includes Other surgical history (07/21/2015); Other surgical history (07/21/2015); Other surgical history (11/14/2018); and Other surgical history (11/14/2018).     Social History  He reports that he has quit smoking. His smoking use included cigarettes. He has a 20 pack-year smoking history. He has never used smokeless tobacco. He reports that he does not currently use alcohol. He reports that he does not use drugs.    Family History  Family History   Problem Relation Name Age of Onset    Liver cancer Father          Allergies  Patient has no known allergies.    ROS: 12 system review was completed and is negative with the exception of those signs and symptoms noted in the history of present illness: A 12 system review was completed and is negative with the exception of those signs and symptoms noted in the history of present illness.     Exam:  General: in NAD, appears stated age  Head: normocephalic, atraumatic  Respiratory: normal effort, no use of accessory muscles  Cardiovascular: no edema noted  Skin: normal turgor, no rashes  Neurologic: grossly  "intact, oriented to person/place/time  Psychiatric: mode and affect appropriate  Digital rectal exam-markedly enlarged prostate, not overly firm or indurated  Abdomen: Soft, nontender, nondistended, no palpable flank mass     Last Recorded Vitals  Blood pressure 133/83, pulse 75, resp. rate 16, height 1.702 m (5' 7\"), weight 104 kg (229 lb 4.5 oz).    Lab Results   Component Value Date    PSASCREEN 5.29 (H) 07/31/2023    CREATININE 1.37 (H) 03/28/2024    HGB 14.9 11/23/2021         ASSESSMENT/PLAN:  # Gross hematuria  -Plan for CT urogram and cystoscopy, discussed indications today  -Suspect secondary to BPH, but we will rule out any other potential causes  -Start finasteride 5 mg    # Lower urinary tract symptoms, BPH  -Start finasteride 5 mg daily  -We also discussed starting tamsulosin, they would prefer to start 1 medication at a time  -We will review symptoms at cystoscopy appointment    # Elevated PSA  -Reassuring PSA free, suspect secondary to BPH  -Repeat PSA in 1 year    Judson Donato MD    "

## 2024-08-02 ENCOUNTER — LAB (OUTPATIENT)
Dept: LAB | Facility: LAB | Age: 84
End: 2024-08-02
Payer: COMMERCIAL

## 2024-08-02 DIAGNOSIS — R31.0 GROSS HEMATURIA: ICD-10-CM

## 2024-08-02 LAB
CREAT SERPL-MCNC: 1.02 MG/DL (ref 0.5–1.3)
EGFRCR SERPLBLD CKD-EPI 2021: 72 ML/MIN/1.73M*2

## 2024-08-02 PROCEDURE — 82565 ASSAY OF CREATININE: CPT

## 2024-08-02 PROCEDURE — 36415 COLL VENOUS BLD VENIPUNCTURE: CPT

## 2024-08-06 ENCOUNTER — HOSPITAL ENCOUNTER (OUTPATIENT)
Dept: RADIOLOGY | Facility: HOSPITAL | Age: 84
Discharge: HOME | End: 2024-08-06
Payer: COMMERCIAL

## 2024-08-06 DIAGNOSIS — R31.0 GROSS HEMATURIA: ICD-10-CM

## 2024-08-06 PROCEDURE — 76377 3D RENDER W/INTRP POSTPROCES: CPT

## 2024-08-06 PROCEDURE — 76377 3D RENDER W/INTRP POSTPROCES: CPT | Performed by: RADIOLOGY

## 2024-08-06 PROCEDURE — 2550000001 HC RX 255 CONTRASTS: Performed by: UROLOGY

## 2024-08-06 PROCEDURE — 74178 CT ABD&PLV WO CNTR FLWD CNTR: CPT | Performed by: RADIOLOGY

## 2024-08-13 ENCOUNTER — TELEPHONE (OUTPATIENT)
Dept: UROLOGY | Facility: CLINIC | Age: 84
End: 2024-08-13
Payer: COMMERCIAL
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
## 2024-08-20 ENCOUNTER — CLINICAL SUPPORT (OUTPATIENT)
Dept: AUDIOLOGY | Facility: CLINIC | Age: 84
End: 2024-08-20
Payer: COMMERCIAL

## 2024-08-20 DIAGNOSIS — H90.3 ASYMMETRICAL SENSORINEURAL HEARING LOSS: Primary | ICD-10-CM

## 2024-08-20 DIAGNOSIS — H90.3 SENSORINEURAL HEARING LOSS (SNHL) OF BOTH EARS: ICD-10-CM

## 2024-08-20 PROCEDURE — 92550 TYMPANOMETRY & REFLEX THRESH: CPT | Performed by: AUDIOLOGIST

## 2024-08-20 PROCEDURE — 92557 COMPREHENSIVE HEARING TEST: CPT | Performed by: AUDIOLOGIST

## 2024-08-20 PROCEDURE — 92565 STENGER TEST PURE TONE: CPT | Performed by: AUDIOLOGIST

## 2024-08-20 NOTE — LETTER
2024     Waylon Nogueira DO  [address]  [place] [place] [facility] [address]    Patient: Shashi Murray   YOB: 1940   Date of Visit: 2024       Dear Dr. Waylon Nogueira DO:    Thank you for referring Shashi Murray to me for evaluation. Below are my notes for this consultation.  If you have questions, please do not hesitate to call me. I look forward to following your patient along with you.       Sincerely,     HERMELINDA Pierre, CCC-A      CC: No Recipients  ______________________________________________________________________________________    AUDIOLOGY ADULT AUDIOMETRIC EVALUATION      Name:  Shashi Murray  :  1940  Age:  84 y.o.  Date of Evaluation: 24    History:  Reason for visit:  Mr. Shashi Murray was seen today for an evaluation of hearing.   The patient was kindly referred by their primary care physician, Waylon Nogueira DO   The patient was accompanied by, his daughter, who assisted in translation at times. The patient is an English as a second language speaker with a primary language of [place]ian.  Chief Complaint   Patient presents with   • Hearing Loss     Reported a history of hearing loss, greater in the left ear, for the past few years. Currently he is wearing a left slim tube BTE hearing aid fit by an outside provider. Stated his current hearing aid may be approximately five years old, and is functioning, however, he may be interested in something newer. He and his daughter have some concern that either his hearing has changed or that his hearing aid is no longer functioning. Previous hearing testing was not available for review at this time. His daughter stated she has noticed he has not been hearing well and has been asking speakers for repetition. His daughter mentioned he has never worn a right hearing aid, however, she believes it may be beneficial for him to have a second hearing aid. Stated he may have been seen by a [facility]  otolaryngologist in the past, but he has not recently seen an ENT physician. He is unsure why there is a difference in hearing, however, mentioned it may be due to some noise exposure. 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   Denied any current otalgia, aural fullness, tinnitus, ear pressure, dizziness/vertigo, ear surgery, recent ear/sinus infections, recent falls, diabetes, chemotherapy/radiation, heart/kidney problems, recent heart attack/strokes, new onset of headaches, sinus/throat concerns, speech/memory concerns, ear drainage, or sudden hearing loss.    EVALUATION     See Audiogram    RESULTS:    Otoscopic Evaluation:   Right Ear: Otoscopy revealed a clear healthy canal and a healthy tympanic membrane was visualized.   Left Ear: Otoscopy revealed a clear healthy canal and a healthy tympanic membrane was visualized.     Immittance:  Immittance Measures: 226 Hz   Right Ear: Tympanometric testing revealed a normal type A tympanogram with normal middle ear pressure and normal static compliance.  Left Ear: Tympanometric testing revealed a normal type A tympanogram with normal middle ear pressure and normal static compliance.    Right Ear: Ipsilateral acoustic reflexes were present at, 500-4,000 Hz, at expected sensation levels.  Left Ear: Ipsilateral acoustic reflexes were present at, 500-1,000 Hz, at expected sensation levels and absent at 2,000-4,000 Hz.    Test technique:  Pure Tone Audiometry via insert earphones.     Reliability:   good    Pure Tone Audiometry:    Right Ear: Audiometric testing indicated a mild to moderate sensorienrual hearing loss through 2,000 Hz, sloping to a moderately severe to severe sensorineural hearing loss 6,000 Hz, sloping to a profound sensorineural hearing loss above.   Left Ear:   Audiometric testing indicated a moderate sensorineural hearing loss through 500 Hz, sloping to a severe sensorineural  hearing loss through 2,000 Hz, sloping to a profound sensorineural hearing loss above.  Negative Yariel noted at 1,000 Hz, consistent with reliable auditory thresholds.   Note significant overall asymmetry in the left ear.       Speech Audiometry:   Right Ear:  Speech Reception Threshold (SRT) was obtained at 45 dBHL                  Word Recognition scores were poor (68%) in quiet when words were presented at 85 dBHL  Left Ear:  Speech Reception Threshold (SRT) was obtained at  65 dBHL                  Word Recognition scores were very poor (12%) in quiet when words were presented at 95 dBHL  Testing was performed with recorded NU-6 speech words in quiet. Speech thresholds were in good agreement with the pure tone averages in each ear.     IMPRESSIONS:  Today's test results are hearing loss requiring medical/otologic and audiologic follow-up.  The patient was counseled with regard to the findings.    Amplification needs:  Hearing aids were recommended due to the hearing loss and the patient's concerns for hearing difficulties.     RECOMMENDATIONS:  * Continue medical follow up with Waylon Nogueira DO.  * Due to the asymmetric sensorineural hearing loss, consider a referral to an otolaryngologist.   * Retest as medically indicated, or sooner if a change in hearing sensitivity is noticed.   * Wear hearing protection while in the presence of loud sounds.   * Pending medical management and patient desire, consider returning for a hearing aid evaluation to discuss amplification options and communication needs. The patient was instructed to call their insurance to check for any hearing aid benefits and to determine if [facility] was in network for hearing aids. Briefly discussed utilizing a right hearing aid or considering a BiCros hearing aid due to the poor word recognition scores in the left ear.   * Use effective communication strategies such as asking the speaker to gain attention prior to speaking,  speaking in the same room, repeating words that were heard, etc.  * Consider use of T.V. Ears, or like device, while watching television.  * Consider returning to current hearing aid provider for hearing aid adjustments.   * [facility] the significant hearing loss.    PATIENT EDUCATION:   Discussed results and recommendations with the patient and a copy of the hearing test was provided.  Questions were addressed and the patient was encouraged to contact our department should concerns arise.  Discussed speech intelligibility, cognitive load and listening effort. The patient was counseled to consider wearing a right hearing aid or a BiCros hearing aid as the left ear may not be providing significant help with communication.  The patient was seen from  3:30-4:30 pm.

## 2024-08-21 ASSESSMENT — ENCOUNTER SYMPTOMS: OCCASIONAL FEELINGS OF UNSTEADINESS: 0

## 2024-08-21 ASSESSMENT — PAIN SCALES - GENERAL: PAINLEVEL_OUTOF10: 0 - NO PAIN

## 2024-08-21 ASSESSMENT — PAIN - FUNCTIONAL ASSESSMENT: PAIN_FUNCTIONAL_ASSESSMENT: 0-10

## 2024-08-21 NOTE — PROGRESS NOTES
AUDIOLOGY ADULT AUDIOMETRIC EVALUATION      Name:  Shashi Murray  :  1940  Age:  84 y.o.  Date of Evaluation: 24    History:  Reason for visit:  Mr. Shashi Murray was seen today for an evaluation of hearing.   The patient was kindly referred by their primary care physician, Waylon Nogueira,    The patient was accompanied by, his daughter, who assisted in translation at times. The patient is an English as a second language speaker with a primary language of Hebrew.  Chief Complaint   Patient presents with    Hearing Loss     Reported a history of hearing loss, greater in the left ear, for the past few years. Currently he is wearing a left slim tube BTE hearing aid fit by an outside provider. Stated his current hearing aid may be approximately five years old, and is functioning, however, he may be interested in something newer. He and his daughter have some concern that either his hearing has changed or that his hearing aid is no longer functioning. Previous hearing testing was not available for review at this time. His daughter stated she has noticed he has not been hearing well and has been asking speakers for repetition. His daughter mentioned he has never worn a right hearing aid, however, she believes it may be beneficial for him to have a second hearing aid. Stated he may have been seen by a [facility] otolaryngologist in the past, but he has not recently seen an ENT physician. He is unsure why there is a difference in hearing, however, mentioned it may be due to some noise exposure. 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   Denied any current otalgia, aural fullness, tinnitus, ear pressure, dizziness/vertigo, ear surgery, recent ear/sinus infections, recent falls, diabetes, chemotherapy/radiation, heart/kidney problems, recent heart attack/strokes, new onset of headaches, sinus/throat concerns, speech/memory  concerns, ear drainage, or sudden hearing loss.    EVALUATION     See Audiogram    RESULTS:    Otoscopic Evaluation:   Right Ear: Otoscopy revealed a clear healthy canal and a healthy tympanic membrane was visualized.   Left Ear: Otoscopy revealed a clear healthy canal and a healthy tympanic membrane was visualized.     Immittance:  Immittance Measures: 226 Hz   Right Ear: Tympanometric testing revealed a normal type A tympanogram with normal middle ear pressure and normal static compliance.  Left Ear: Tympanometric testing revealed a normal type A tympanogram with normal middle ear pressure and normal static compliance.    Right Ear: Ipsilateral acoustic reflexes were present at, 500-4,000 Hz, at expected sensation levels.  Left Ear: Ipsilateral acoustic reflexes were present at, 500-1,000 Hz, at expected sensation levels and absent at 2,000-4,000 Hz.    Test technique:  Pure Tone Audiometry via insert earphones.     Reliability:   good    Pure Tone Audiometry:    Right Ear: Audiometric testing indicated a mild to moderate sensorienrual hearing loss through 2,000 Hz, sloping to a moderately severe to severe sensorineural hearing loss 6,000 Hz, sloping to a profound sensorineural hearing loss above.   Left Ear:   Audiometric testing indicated a moderate sensorineural hearing loss through 500 Hz, sloping to a severe sensorineural hearing loss through 2,000 Hz, sloping to a profound sensorineural hearing loss above.  Negative Yariel noted at 1,000 Hz, consistent with reliable auditory thresholds.   Note significant overall asymmetry in the left ear.       Speech Audiometry:   Right Ear:  Speech Reception Threshold (SRT) was obtained at 45 dBHL                  Word Recognition scores were poor (68%) in quiet when words were presented at 85 dBHL  Left Ear:  Speech Reception Threshold (SRT) was obtained at  65 dBHL                  Word Recognition scores were very poor (12%) in quiet when words were presented at 95  dBHL  Testing was performed with recorded NU-6 speech words in quiet. Speech thresholds were in good agreement with the pure tone averages in each ear.     IMPRESSIONS:  Today's test results are hearing loss requiring medical/otologic and audiologic follow-up.  The patient was counseled with regard to the findings.    Amplification needs:  Hearing aids were recommended due to the hearing loss and the patient's concerns for hearing difficulties.     RECOMMENDATIONS:  * Continue medical follow up with Waylon Nogueira DO.  * Due to the asymmetric sensorineural hearing loss, consider a referral to an otolaryngologist.   * Retest as medically indicated, or sooner if a change in hearing sensitivity is noticed.   * Wear hearing protection while in the presence of loud sounds.   * Pending medical management and patient desire, consider returning for a hearing aid evaluation to discuss amplification options and communication needs. The patient was instructed to call their insurance to check for any hearing aid benefits and to determine if [facility] was in network for hearing aids. Briefly discussed utilizing a right hearing aid or considering a BiCros hearing aid due to the poor word recognition scores in the left ear.   * Use effective communication strategies such as asking the speaker to gain attention prior to speaking, speaking in the same room, repeating words that were heard, etc.  * Consider use of T.V. Ears, or like device, while watching television.  * Consider returning to current hearing aid provider for hearing aid adjustments.   * Consider a cochlear implant evaluation with the [place] office due to the significant hearing loss.    PATIENT EDUCATION:   Discussed results and recommendations with the patient and a copy of the hearing test was provided.  Questions were addressed and the patient was encouraged to contact our department should concerns arise.  Discussed speech intelligibility, cognitive  load and listening effort. The patient was counseled to consider wearing a right hearing aid or a BiCros hearing aid as the left ear may not be providing significant help with communication.  The patient was seen from  3:30-4:30 pm.

## 2024-09-05 ENCOUNTER — PROCEDURE VISIT (OUTPATIENT)
Dept: UROLOGY | Facility: CLINIC | Age: 84
End: 2024-09-05
Payer: COMMERCIAL

## 2024-09-05 VITALS
DIASTOLIC BLOOD PRESSURE: 88 MMHG | RESPIRATION RATE: 18 BRPM | BODY MASS INDEX: 38.53 KG/M2 | HEART RATE: 91 BPM | WEIGHT: 231.26 LBS | HEIGHT: 65 IN | SYSTOLIC BLOOD PRESSURE: 135 MMHG

## 2024-09-05 DIAGNOSIS — R31.0 GROSS HEMATURIA: ICD-10-CM

## 2024-09-05 DIAGNOSIS — N40.0 ENLARGED PROSTATE: ICD-10-CM

## 2024-09-05 PROCEDURE — 52000 CYSTOURETHROSCOPY: CPT | Performed by: UROLOGY

## 2024-09-05 PROCEDURE — 99214 OFFICE O/P EST MOD 30 MIN: CPT | Performed by: UROLOGY

## 2024-09-05 RX ORDER — LIDOCAINE HYDROCHLORIDE 20 MG/ML
1 JELLY TOPICAL ONCE
Status: COMPLETED | OUTPATIENT
Start: 2024-09-05 | End: 2024-09-05

## 2024-09-05 ASSESSMENT — PAIN SCALES - GENERAL: PAINLEVEL: 0-NO PAIN

## 2024-09-05 NOTE — PROGRESS NOTES
PRIOR NOTES  84-year-old male here to see me regarding rising PSA and gross hematuria  PMH: Hypertension, hyperlipidemia, hard of hearing, CKD 3 AA, COPD  Fhx: Father liver ca, 2 brothers but not prostate or breast ca  PSA history:  03/24: 6.4, 33% free  07/23: 5.29     Pt had gross hematuria x 2. Pt has LUTS w/ straining, hesitancy, weak stream. Urgency.   Denies: incontinence, severe frequency  NTF 2-3x  **start finasteride  **CTU + cysto    8/6/24 - hyperdense renal cysts, distended bladder w/ diverticuli, 218 mL prostate    UPDATED SUBJECTIVE HISTORY  09/05/24 -here for cystoscopy for gross hematuria    Past Medical History  He has a past medical history of Benign prostatic hyperplasia without lower urinary tract symptoms, Chronic obstructive pulmonary disease, unspecified (Multi) (11/07/2022), Elevated prostate specific antigen (PSA), Essential (primary) hypertension (11/07/2022), Personal history of other diseases of the digestive system (11/30/2022), and Pure hyperglyceridemia (11/07/2022).    Surgical History  He has a past surgical history that includes Other surgical history (07/21/2015); Other surgical history (07/21/2015); Other surgical history (11/14/2018); and Other surgical history (11/14/2018).     Social History  He reports that he has quit smoking. His smoking use included cigarettes. He has a 20 pack-year smoking history. He has never used smokeless tobacco. He reports that he does not currently use alcohol. He reports that he does not use drugs.    Family History  Family History   Problem Relation Name Age of Onset    Liver cancer Father          Allergies  Patient has no known allergies.    ROS: 12 system review was completed and is negative with the exception of those signs and symptoms noted in the history of present illness: A 12 system review was completed and is negative with the exception of those signs and symptoms noted in the history of present illness.     Exam:  General: in NAD,  appears stated age  Head: normocephalic, atraumatic  Respiratory: normal effort, no use of accessory muscles  Cardiovascular: no edema noted  Skin: normal turgor, no rashes  Neurologic: grossly intact, oriented to person/place/time  Psychiatric: mode and affect appropriate    Patient ID: Shashi Murray is a 84 y.o. male.    Cystoscopy    Date/Time: 9/5/2024 3:02 PM    Performed by: Judson Donato MD  Authorized by: Judson Donato MD      Comments:      The R/B/A to the following procedure were discussed and an informed consent was signed. A time-out was performed confirming the correct procedure, laterality (if applicable), and plan.    The patient was prepped and draped in the standard sterile fashion. A 16 Lebanese flexible cystoscope was inserted through the penis. The following finding were noted:    Anterior urethra -normal  Prostate -massively enlarged lateral lobes and median lobe with intravesical protrusion  Bladder mucosa-3+ trabeculation, cellules, evidence of chronic damage  Ureteral orifices -visualized in orthotopic position    The patient tolerated the procedure well.           Last Recorded Vitals  There were no vitals taken for this visit.    Lab Results   Component Value Date    PSASCREEN 5.29 (H) 07/31/2023    CREATININE 1.02 08/02/2024    HGB 14.9 11/23/2021         ASSESSMENT/PLAN:  # BPH with lower urinary tract symptoms, gross hematuria  -Continue finasteride  -Recommended proceeding with surgical management considering severe damage of the bladder, massive BPH, lower urinary tract symptoms  -We discussed robotic simple prostatectomy versus holmium enucleation of the prostate versus prostatic artery embolization  -I am leaning towards prostatic artery embolization but I would like him to discuss with Dr. Linn potentially doing holmium enucleation  -Despite being 84, patient has good functional status can go up a flight of stairs or walk a block without significant dyspnea or chest  pain    Judson Donato MD

## 2024-09-10 ENCOUNTER — TELEPHONE (OUTPATIENT)
Dept: UROLOGY | Facility: HOSPITAL | Age: 84
End: 2024-09-10
Payer: COMMERCIAL

## 2024-09-23 DIAGNOSIS — E78.2 MIXED HYPERLIPIDEMIA: ICD-10-CM

## 2024-09-24 RX ORDER — ATORVASTATIN CALCIUM 20 MG/1
20 TABLET, FILM COATED ORAL DAILY
Qty: 90 TABLET | Refills: 0 | Status: SHIPPED | OUTPATIENT
Start: 2024-09-24

## 2024-10-03 ENCOUNTER — APPOINTMENT (OUTPATIENT)
Dept: PRIMARY CARE | Facility: CLINIC | Age: 84
End: 2024-10-03
Payer: COMMERCIAL

## 2024-10-03 VITALS
TEMPERATURE: 97.9 F | HEIGHT: 69 IN | OXYGEN SATURATION: 91 % | DIASTOLIC BLOOD PRESSURE: 78 MMHG | BODY MASS INDEX: 34.16 KG/M2 | HEART RATE: 78 BPM | WEIGHT: 230.6 LBS | SYSTOLIC BLOOD PRESSURE: 120 MMHG

## 2024-10-03 DIAGNOSIS — K21.9 GASTROESOPHAGEAL REFLUX DISEASE WITHOUT ESOPHAGITIS: ICD-10-CM

## 2024-10-03 DIAGNOSIS — Z87.19 HISTORY OF BARRETT'S ESOPHAGUS: ICD-10-CM

## 2024-10-03 DIAGNOSIS — E55.9 VITAMIN D DEFICIENCY: ICD-10-CM

## 2024-10-03 DIAGNOSIS — I12.9 HYPERTENSIVE KIDNEY DISEASE WITH STAGE 3A CHRONIC KIDNEY DISEASE (MULTI): ICD-10-CM

## 2024-10-03 DIAGNOSIS — R73.01 IMPAIRED FASTING GLUCOSE: ICD-10-CM

## 2024-10-03 DIAGNOSIS — J44.9 CHRONIC OBSTRUCTIVE PULMONARY DISEASE, UNSPECIFIED COPD TYPE (MULTI): ICD-10-CM

## 2024-10-03 DIAGNOSIS — R82.81 PYURIA: ICD-10-CM

## 2024-10-03 DIAGNOSIS — M1A.09X0 IDIOPATHIC CHRONIC GOUT OF MULTIPLE SITES WITHOUT TOPHUS: ICD-10-CM

## 2024-10-03 DIAGNOSIS — E66.812 CLASS 2 SEVERE OBESITY DUE TO EXCESS CALORIES WITH SERIOUS COMORBIDITY AND BODY MASS INDEX (BMI) OF 36.0 TO 36.9 IN ADULT: ICD-10-CM

## 2024-10-03 DIAGNOSIS — E78.2 MIXED HYPERLIPIDEMIA: ICD-10-CM

## 2024-10-03 DIAGNOSIS — E66.01 CLASS 2 SEVERE OBESITY DUE TO EXCESS CALORIES WITH SERIOUS COMORBIDITY AND BODY MASS INDEX (BMI) OF 36.0 TO 36.9 IN ADULT: ICD-10-CM

## 2024-10-03 DIAGNOSIS — N18.31 HYPERTENSIVE KIDNEY DISEASE WITH STAGE 3A CHRONIC KIDNEY DISEASE (MULTI): ICD-10-CM

## 2024-10-03 DIAGNOSIS — I10 PRIMARY HYPERTENSION: Primary | ICD-10-CM

## 2024-10-03 DIAGNOSIS — R97.20 INCREASED PROSTATE SPECIFIC ANTIGEN (PSA) VELOCITY: ICD-10-CM

## 2024-10-03 DIAGNOSIS — G47.33 OBSTRUCTIVE SLEEP APNEA SYNDROME: ICD-10-CM

## 2024-10-03 DIAGNOSIS — G89.29 CHRONIC PAIN OF LEFT KNEE: ICD-10-CM

## 2024-10-03 DIAGNOSIS — M25.562 CHRONIC PAIN OF LEFT KNEE: ICD-10-CM

## 2024-10-03 DIAGNOSIS — R30.0 DYSURIA: ICD-10-CM

## 2024-10-03 LAB
POC APPEARANCE, URINE: ABNORMAL
POC BILIRUBIN, URINE: NEGATIVE
POC BLOOD, URINE: ABNORMAL
POC COLOR, URINE: ABNORMAL
POC GLUCOSE, URINE: NEGATIVE MG/DL
POC KETONES, URINE: NEGATIVE MG/DL
POC LEUKOCYTES, URINE: ABNORMAL
POC NITRITE,URINE: NEGATIVE
POC PH, URINE: 8.5 PH
POC PROTEIN, URINE: ABNORMAL MG/DL
POC SPECIFIC GRAVITY, URINE: 1.02
POC UROBILINOGEN, URINE: 0.2 EU/DL

## 2024-10-03 PROCEDURE — 3074F SYST BP LT 130 MM HG: CPT | Performed by: FAMILY MEDICINE

## 2024-10-03 PROCEDURE — 87086 URINE CULTURE/COLONY COUNT: CPT

## 2024-10-03 PROCEDURE — 1160F RVW MEDS BY RX/DR IN RCRD: CPT | Performed by: FAMILY MEDICINE

## 2024-10-03 PROCEDURE — 1159F MED LIST DOCD IN RCRD: CPT | Performed by: FAMILY MEDICINE

## 2024-10-03 PROCEDURE — 3078F DIAST BP <80 MM HG: CPT | Performed by: FAMILY MEDICINE

## 2024-10-03 PROCEDURE — G2211 COMPLEX E/M VISIT ADD ON: HCPCS | Performed by: FAMILY MEDICINE

## 2024-10-03 PROCEDURE — 99214 OFFICE O/P EST MOD 30 MIN: CPT | Performed by: FAMILY MEDICINE

## 2024-10-03 PROCEDURE — 81003 URINALYSIS AUTO W/O SCOPE: CPT | Performed by: FAMILY MEDICINE

## 2024-10-03 RX ORDER — NITROFURANTOIN 25; 75 MG/1; MG/1
100 CAPSULE ORAL 2 TIMES DAILY
Qty: 14 CAPSULE | Refills: 0 | Status: SHIPPED | OUTPATIENT
Start: 2024-10-03 | End: 2024-10-10

## 2024-10-03 RX ORDER — LISINOPRIL AND HYDROCHLOROTHIAZIDE 12.5; 2 MG/1; MG/1
1 TABLET ORAL DAILY
Qty: 100 TABLET | Refills: 0 | Status: SHIPPED | OUTPATIENT
Start: 2024-10-03 | End: 2025-01-11

## 2024-10-03 RX ORDER — PHENAZOPYRIDINE HYDROCHLORIDE 200 MG/1
200 TABLET, FILM COATED ORAL 3 TIMES DAILY PRN
Qty: 6 TABLET | Refills: 0 | Status: SHIPPED | OUTPATIENT
Start: 2024-10-03

## 2024-10-03 ASSESSMENT — ENCOUNTER SYMPTOMS
DYSURIA: 1
NAUSEA: 1

## 2024-10-03 ASSESSMENT — PATIENT HEALTH QUESTIONNAIRE - PHQ9
SUM OF ALL RESPONSES TO PHQ9 QUESTIONS 1 AND 2: 0
1. LITTLE INTEREST OR PLEASURE IN DOING THINGS: NOT AT ALL
2. FEELING DOWN, DEPRESSED OR HOPELESS: NOT AT ALL

## 2024-10-03 NOTE — PROGRESS NOTES
Subjective   Patient ID: Shashi Murray is a 84 y.o. male who presents for Follow-up.    HPI     Pt is accompanied by his daughter today.    Patient declines flu/covid vaccines today.    Patient has been experience burning when urinating and odor x 1 week after his cystoscopy.  Pt has tried cranberry juice.  Had cystoscopy 9/5/2024 with Dr. Donato.  Pt has an appt next month for a consultation for a procedure with Dr. Linn (urology).  He was found to have massive BPH on his recent cystoscopy.     Patient c/o left knee pain onset 1 year ago.  He states he cannot bend his knee to put on his socks.   He has used icy-hot with no relief.   Pt states his ankles also get swollen.    Patient states he needs new cpap mask and supplies.    No recent BW  Colonoscopy: 2018    He has HTN.  Patient is compliant with antihypertensive therapy.   He has been compliant with his antihypertensive therapy and denies any noted side effects.  He does not monitor BP at home. He denies CP, SOB, dizziness, and LE edema.     He has hypertriglyceridemia.   Patient is compliant with his fenofibrate.   He denies any noted side effects.   Patient tries to limit the amount of fatty foods and high cholesterol foods in his diet     Patient has impaired fasting glucose.  IHe denies any polyuria, polydipsia, polyphagia.     Patient has COPD.  COPD has remained stable with Symbicort and ProAir as needed.   He denies any SOB above his baseline.   He has not experienced any exacerbations since his last visit.     Patient has GERD and a hx of Porras's esophagus.  His GERD symptoms well controlled with omeprazole OTC.  His last EGD did not reveal previously noted Porras's esophagus and no follow-up EGD was recommended.     Patient has gout.  He is maintained on daily colchicine for gout.  No exacerbations since his last visit here..     He has vitamin D deficiency.  Patient remains compliant with the current dosing of over the counter vitamin D  "supplement     Patient has obstructive sleep apnea.   He uses CPAP nightly and continues to benefit from use.               Review of Systems  Constitutional: Patient denies any fever, chills, loss of appetite, or unexplained weight loss.  Cardiovascular: Patient denies any chest pain, shortness of breath with exertion, tachycardia, palpitations, orthopnea, or paroxysmal nocturnal dyspnea.  Respiratory: Patient denies any cough, shortness breath, or wheezing.  Gastrointestinal: Patient denies any nausea, vomiting, diarrhea, constipation, melena, hematochezia, or reflux symptoms  Skin: Denies any rashes or skin lesions  Neurology: Patient denies any new motor or sensory losses. Denies any numbness, tingling, weakness, and incoordination of the extremities. Patient also denies any tremor, seizures, or gait instability.  Endocrinology: Denies any polyuria, polydipsia, polyphagia, or heat/cold intolerance.    Urinary: Pain and burning while urinating.    Objective   /78   Pulse 78   Temp 36.6 °C (97.9 °F) (Temporal)   Ht 1.753 m (5' 9\")   Wt 105 kg (230 lb 9.6 oz)   SpO2 91%   BMI 34.05 kg/m²     Physical Exam  General Appearance: Alert and cooperative, in no acute distress, well-developed/well-nourished, obese male accompanied by his daughter today.    Neck: Supple and without adenopathy or rigidity. There is no JVD at 90° and no carotid bruits are noted. There is no thyromegaly, thyroid tenderness, or palpable thyroid nodules.  Heart: Regular rate and rhythm without murmur or ectopy.  Lungs: Clear to auscultation bilaterally with good air exchange.  Skin: Good turgor, moist, warm and without rashes or lesions.  Neurological exam: Alert and oriented ×3, no tremor, normal gait.  Extremities: No clubbing, cyanosis, or edema    MS: Small left knee effusion noted. There is medial joint line tenderness.    Assessment/Plan     Flu and COVID vaccines declined today.    HTN: Blood pressure appears adequately " controlled and we will continue with the current antihypertensive therapy.     Hyperlipidemia: Patient will continue with the current medication. Dietary changes, exercise, and maintenance of healthy weight were discussed at length.     GERD / Hx of Hopson's esophagus: His symptoms well controlled with omeprazole.   He has a history of HOPSON'S ESOPHAGUS that had resolved on last EGD.   NO FURTHER EGD RECOMMENDED BY HIS SPECIALIST (DR. GUPTA)  PT TO CONTINUE ON THE OMEPRAZOLE.     COPD: Stable based on symptoms.   Pt is to continue Symbicort and as needed ProAir.      GOUT: Stable based on symptoms (no recent flares).   Patient denies any recent exacerbations.  Pt will use meloxicam as needed for any exacerbations.     Vitamin D deficiency: Stable based on last labs.  He remains compliant with the current dosing of over the counter vitamin D supplementation and he will continue with the same.  Will recheck his vitamin D on next labs.      Impaired fasting glucose:  Stable based on labs.  His most recent A1c was:  Lab Results   Component Value Date    HGBA1C 5.8 (H) 03/28/2024   Dietary changes, exercise, and maintenance of a healthy weight were discussed at length.   We will continue to monitor.  Will recheck his A1c on next labs.     Sleep Apnea: Stable based on symptoms.  He continues to use his CPAP on a nightly basis for well over 4 hours per night.  He continues to benefit from the CPAP therapy.  Will order new CPAP supplies.      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  We will continue to monitor.   Pt has been advised to avoid NSAIDs.     Obesity: Dietary changes, exercise, and maintenance of a healthy weight were discussed at length.     Increased PSA velocity:    Lab Results  Component Value Date    PSA 6.4 (H) 03/28/2024  PSA has increased--->3.9, 5.29, 6.4  We referred to urology for evaluation and he was seen by Dr. Donato 7/11/24.  Underwent a cystoscopy 9/5/2024.  Was found to have massive BPH  with signs of bladder damage.  Pt has not decided how to proceed yet but sounds like he needs to have a procedure to reduce the prostate volume.  Pt has an appointment with Dr. Linn (urology) on 11/7/2024.     Pyuria / dysuria:  UA done in the office today resulted in hematuria and a large amount of leukocytes. Nitrites were negative.  Urine culture ordered.  Will prescribe Macrobid and pyridium for suspected infection and to relieve the burning.  Will refer him back to Dr. Donato if the urinary symptoms persist or worsen.  - nitrofurantoin, macrocrystal-monohydrate, (Macrobid) 100 mg capsule; Take 1 capsule (100 mg) by mouth 2 times a day for 7 days. Dispense: 14 capsule; Refill: 0  - phenazopyridine (Pyridium) 200 mg tablet; Take 1 tablet (200 mg) by mouth 3 times a day as needed for bladder spasms. Dispense: 6 tablet; Refill: 0    Chronic pain of left knee:  Will refer him to Dr. Duffy (ortho) for further evaluation.  Recommended he ice is knee and keep his legs elevated to help with the swelling.      MCAW DUE 7/2025    Follow up in 3 months.       Scribe Attestation  By signing my name below, I, Julián Dolan   attest that this documentation has been prepared under the direction and in the presence of Waylon Nogueira DO.    Orders Placed This Encounter   Procedures    Positive Airway Pressure (PAP) Therapy    Urine Culture    Comprehensive Metabolic Panel    Lipid Panel    Hemoglobin A1C    Vitamin D 25-Hydroxy,Total (for eval of Vitamin D levels)    Referral to Orthopaedic Surgery    POCT UA Automated manually resulted     Requested Prescriptions     Signed Prescriptions Disp Refills    lisinopriL-hydrochlorothiazide 20-12.5 mg tablet 100 tablet 0     Sig: Take 1 tablet by mouth once daily.    nitrofurantoin, macrocrystal-monohydrate, (Macrobid) 100 mg capsule 14 capsule 0     Sig: Take 1 capsule (100 mg) by mouth 2 times a day for 7 days.    phenazopyridine (Pyridium) 200 mg tablet 6 tablet 0      Sig: Take 1 tablet (200 mg) by mouth 3 times a day as needed for bladder spasms.

## 2024-10-03 NOTE — PATIENT INSTRUCTIONS
Follow up in 3 months with labs to be done PRIOR.    It was a pleasure to see you today. Thank you for choosing us for your health care needs.    If you have lab or other testing completed and have not been informed of results within one week, please call the office for your results.    If you haven't done so, consider signing up for [facility] [employer], the [facility] personal health record. Ask the staff how you can get started.

## 2024-10-04 LAB — BACTERIA UR CULT: NORMAL

## 2024-10-18 DIAGNOSIS — I10 PRIMARY HYPERTENSION: ICD-10-CM

## 2024-10-21 RX ORDER — ALBUTEROL SULFATE 90 UG/1
2 INHALANT RESPIRATORY (INHALATION) EVERY 4 HOURS PRN
Qty: 18 G | Refills: 1 | Status: SHIPPED | OUTPATIENT
Start: 2024-10-21

## 2024-10-21 RX ORDER — BUDESONIDE AND FORMOTEROL FUMARATE DIHYDRATE 160; 4.5 UG/1; UG/1
2 AEROSOL RESPIRATORY (INHALATION)
Qty: 10.2 G | Refills: 2 | Status: SHIPPED | OUTPATIENT
Start: 2024-10-21

## 2024-10-29 DIAGNOSIS — M1A.09X0 IDIOPATHIC CHRONIC GOUT OF MULTIPLE SITES WITHOUT TOPHUS: ICD-10-CM

## 2024-10-30 DIAGNOSIS — M1A.09X0 IDIOPATHIC CHRONIC GOUT OF MULTIPLE SITES WITHOUT TOPHUS: ICD-10-CM

## 2024-10-30 RX ORDER — COLCHICINE 0.6 MG/1
1.2 TABLET ORAL DAILY
Qty: 30 TABLET | Refills: 0 | Status: SHIPPED | OUTPATIENT
Start: 2024-10-30 | End: 2024-10-30 | Stop reason: SDUPTHER

## 2024-10-30 RX ORDER — COLCHICINE 0.6 MG/1
TABLET ORAL
Qty: 30 TABLET | Refills: 0 | Status: SHIPPED | OUTPATIENT
Start: 2024-10-30

## 2024-11-01 ENCOUNTER — APPOINTMENT (OUTPATIENT)
Dept: ORTHOPEDIC SURGERY | Facility: CLINIC | Age: 84
End: 2024-11-01
Payer: COMMERCIAL

## 2024-11-07 ENCOUNTER — OFFICE VISIT (OUTPATIENT)
Dept: UROLOGY | Facility: HOSPITAL | Age: 84
End: 2024-11-07
Payer: COMMERCIAL

## 2024-11-07 DIAGNOSIS — N13.8 ENLARGED PROSTATE WITH URINARY OBSTRUCTION: Primary | ICD-10-CM

## 2024-11-07 DIAGNOSIS — R39.16 STRAINING TO VOID: ICD-10-CM

## 2024-11-07 DIAGNOSIS — R39.15 URGENCY OF URINATION: ICD-10-CM

## 2024-11-07 DIAGNOSIS — N40.1 ENLARGED PROSTATE WITH URINARY OBSTRUCTION: Primary | ICD-10-CM

## 2024-11-07 PROCEDURE — 51798 US URINE CAPACITY MEASURE: CPT | Performed by: UROLOGY

## 2024-11-07 PROCEDURE — 99214 OFFICE O/P EST MOD 30 MIN: CPT | Performed by: UROLOGY

## 2024-11-07 PROCEDURE — 99417 PROLNG OP E/M EACH 15 MIN: CPT | Performed by: UROLOGY

## 2024-11-07 NOTE — PROGRESS NOTES
HPI    84 y.o. male being seen with the following problem list:    Problem list:  BPH with LUTS - on Finasteride  Gross hematuria    8/6/24 CT Uro   Benign bilateral renal lesions favoring simple cysts, Bosniak type 1 and type 2 although some are too small to characterize.   Non obstructing calculus left kidney.  Marked enlargement of the prostate favoring BPH with estimated prostate volume of 218 mL with secondary bladder outlet changes.    9/5/24 Cysto with Dr. Donato shows massively enlarged lateral lobes and median lobe with intravesical protrusion   Bladder mucosa-3+ trabeculation, cellules, evidence of chronic damage     11/07/24 - 17cc. Urinary symptoms include straining, hesitancy, weak stream and urgency. No retention or UTIs.        Lab Results   Component Value Date    PSA 6.4 (H) 03/28/2024              Current Medications:  Current Outpatient Medications   Medication Sig Dispense Refill    albuterol 90 mcg/actuation inhaler Inhale 2 puffs every 4 hours if needed for shortness of breath. 18 g 1    atorvastatin (Lipitor) 20 mg tablet Take 1 tablet (20 mg) by mouth once daily. REPLACES THE FENOFIBRATE 90 tablet 0    budesonide-formoteroL (Symbicort) 160-4.5 mcg/actuation inhaler Inhale 2 puffs 2 times a day. 10.2 g 2    cholecalciferol (Vitamin D-3) 50 MCG (2000 UT) tablet Take 1 tablet (50 mcg) by mouth once daily.      ciclopirox (Penlac) 8 % solution APPLY THIN FILM DAILY TO TOENAILS. REMOVE FILM ONCE A WEEK WITH ISOPROPYL ALCOHOL      colchicine 0.6 mg tablet TAKE 2 TABLETS AT FIRST SIGN OF GOUT FLARE, THEN TAKE 1 TABLET ONE HOUR LATER 30 tablet 0    finasteride (Proscar) 5 mg tablet Take 1 tablet (5 mg) by mouth once daily. Do not crush, chew, or split. 90 tablet 3    fluocinonide (Lidex) 0.05 % ointment Apply topically early in the morning..      lisinopriL-hydrochlorothiazide 20-12.5 mg tablet Take 1 tablet by mouth once daily. 100 tablet 0    omeprazole (PriLOSEC) 40 mg DR capsule Take 1 capsule  (40 mg) by mouth once daily. 90 capsule 0    phenazopyridine (Pyridium) 200 mg tablet Take 1 tablet (200 mg) by mouth 3 times a day as needed for bladder spasms. 6 tablet 0     No current facility-administered medications for this visit.        Active Problems:  Shashi Murray is a 84 y.o. male with the following Problems and Medications.  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    Dysphagia    Erectile dysfunction    Gastroesophageal reflux disease    Gout    History of colon polyps    Hypertension    Idiopathic hypersomnia    Impaired fasting glucose    Neck pain    Sensorineural hearing loss    Sleep apnea    Stage 3a chronic kidney disease (Multi)    Vitamin D deficiency    History of Porras's esophagus    Increased prostate specific antigen (PSA) velocity    Age-related nuclear cataract of right eye    Class 2 severe obesity due to excess calories with serious comorbidity and body mass index (BMI) of 36.0 to 36.9 in adult    Combined form of age-related cataract, left eye    Hypertriglyceridemia    Skin lesion of upper extremity    Pseudophakia of right eye    Pterygium of left eye    PVD (posterior vitreous detachment)    Mixed hyperlipidemia     Current Outpatient Medications   Medication Sig Dispense Refill    albuterol 90 mcg/actuation inhaler Inhale 2 puffs every 4 hours if needed for shortness of breath. 18 g 1    atorvastatin (Lipitor) 20 mg tablet Take 1 tablet (20 mg) by mouth once daily. REPLACES THE FENOFIBRATE 90 tablet 0    budesonide-formoteroL (Symbicort) 160-4.5 mcg/actuation inhaler Inhale 2 puffs 2 times a day. 10.2 g 2    cholecalciferol (Vitamin D-3) 50 MCG (2000 UT) tablet Take 1 tablet (50 mcg) by mouth once daily.      ciclopirox (Penlac) 8 % solution APPLY THIN FILM DAILY TO TOENAILS. REMOVE FILM ONCE A WEEK WITH ISOPROPYL ALCOHOL      colchicine 0.6 mg tablet  TAKE 2 TABLETS AT FIRST SIGN OF GOUT FLARE, THEN TAKE 1 TABLET ONE HOUR LATER 30 tablet 0    finasteride (Proscar) 5 mg tablet Take 1 tablet (5 mg) by mouth once daily. Do not crush, chew, or split. 90 tablet 3    fluocinonide (Lidex) 0.05 % ointment Apply topically early in the morning..      lisinopriL-hydrochlorothiazide 20-12.5 mg tablet Take 1 tablet by mouth once daily. 100 tablet 0    omeprazole (PriLOSEC) 40 mg DR capsule Take 1 capsule (40 mg) by mouth once daily. 90 capsule 0    phenazopyridine (Pyridium) 200 mg tablet Take 1 tablet (200 mg) by mouth 3 times a day as needed for bladder spasms. 6 tablet 0     No current facility-administered medications for this visit.       PMH:  Past Medical History:   Diagnosis Date    Benign prostatic hyperplasia without lower urinary tract symptoms     BPH (benign prostatic hypertrophy)    Chronic obstructive pulmonary disease, unspecified 11/07/2022    COPD (chronic obstructive pulmonary disease)    Elevated prostate specific antigen (PSA)     Abnormal PSA    Essential (primary) hypertension 11/07/2022    Hypertension    Personal history of other diseases of the digestive system 11/30/2022    History of Porras's esophagus    Pure hyperglyceridemia 11/07/2022    Hypertriglyceridemia       PSH:  Past Surgical History:   Procedure Laterality Date    OTHER SURGICAL HISTORY  07/21/2015    Biopsy Of The Prostate Punch    OTHER SURGICAL HISTORY  07/21/2015    Surg Prostate Transureth Dest Tissue Microwave Thermotherapy    OTHER SURGICAL HISTORY  11/14/2018    Esophagogastroduodenoscopy    OTHER SURGICAL HISTORY  11/14/2018    Colonoscopy complete for polypectomy       FMH:  Family History   Problem Relation Name Age of Onset    Liver cancer Father         SHx:  Social History     Tobacco Use    Smoking status: Former     Current packs/day: 1.00     Average packs/day: 1 pack/day for 20.0 years (20.0 ttl pk-yrs)     Types: Cigarettes    Smokeless tobacco: Never   Substance  Use Topics    Alcohol use: Not Currently    Drug use: Never       Allergies:  No Known Allergies    Assessment/Plan  84 year old male with BPH, 218g, urinary symptoms include straining, hesitancy, weak stream and urgency. No retention or infection. We discussed options including RASP, HoLEP + botox, PAE. Unfortunately, due to nationwide saline shortage, I am unable to perform a HoLEP for him at this point due to size of his prostate. Given his age, Dr. Donato is not inclined towards robot assisted simple prostatectomy. Discussed waiting for a HoLEP or proceeding with PAE, he would like to try this while he waits. Will send a referral to interventional radiology for PAE.     Follow up in 3 months for symptom check.     Scribe Attestation  By signing my name below, I, Hernan Flor, Dinaibe, attest that this documentation  has been prepared under the direction and in the presence of Luigi Linn MD.

## 2024-11-11 ENCOUNTER — APPOINTMENT (OUTPATIENT)
Dept: RADIOLOGY | Facility: HOSPITAL | Age: 84
End: 2024-11-11
Payer: COMMERCIAL

## 2024-11-21 ENCOUNTER — HOSPITAL ENCOUNTER (OUTPATIENT)
Dept: RADIOLOGY | Facility: HOSPITAL | Age: 84
Discharge: HOME | End: 2024-11-21
Payer: COMMERCIAL

## 2024-11-21 DIAGNOSIS — N40.1 ENLARGED PROSTATE WITH URINARY OBSTRUCTION: ICD-10-CM

## 2024-11-21 DIAGNOSIS — N13.8 ENLARGED PROSTATE WITH URINARY OBSTRUCTION: ICD-10-CM

## 2024-11-21 PROCEDURE — 99204 OFFICE O/P NEW MOD 45 MIN: CPT | Performed by: RADIOLOGY

## 2024-11-22 ENCOUNTER — APPOINTMENT (OUTPATIENT)
Dept: ORTHOPEDIC SURGERY | Facility: CLINIC | Age: 84
End: 2024-11-22
Payer: COMMERCIAL

## 2024-11-22 ASSESSMENT — ENCOUNTER SYMPTOMS
DIFFICULTY URINATING: 1
MUSCULOSKELETAL NEGATIVE: 1
CARDIOVASCULAR NEGATIVE: 1
RESPIRATORY NEGATIVE: 1
NEUROLOGICAL NEGATIVE: 1
GASTROINTESTINAL NEGATIVE: 1
CONSTITUTIONAL NEGATIVE: 1

## 2024-11-22 NOTE — H&P
History Of Present Illness  Shashi Murray is a 84 y.o. male presenting with BPH, very large prostate (218g), urinary symptoms include straining, hesitancy, weak stream and urgency. No retention or infection. He was evaluated by two of my urology colleagues, Dr. Donato and Dr. Linn. Given the prostate size and the patient's age an co morbidities Dr. Donato did not feel he is a good candidate for prostatectomy. He was also evaluated for HoLEP, but Dr. Linn is deferring at this time. He did discuss the PAE procedure with the patient and his daughter, who presents with him today. The PAE could be performed prior to a Holep or as a stand alone procedure, depending on the outcome.     Past Medical History  Past Medical History:   Diagnosis Date    Benign prostatic hyperplasia without lower urinary tract symptoms     BPH (benign prostatic hypertrophy)    Chronic obstructive pulmonary disease, unspecified 11/07/2022    COPD (chronic obstructive pulmonary disease)    Elevated prostate specific antigen (PSA)     Abnormal PSA    Essential (primary) hypertension 11/07/2022    Hypertension    Personal history of other diseases of the digestive system 11/30/2022    History of Porras's esophagus    Pure hyperglyceridemia 11/07/2022    Hypertriglyceridemia       Surgical History  Past Surgical History:   Procedure Laterality Date    OTHER SURGICAL HISTORY  07/21/2015    Biopsy Of The Prostate Punch    OTHER SURGICAL HISTORY  07/21/2015    Surg Prostate Transureth Dest Tissue Microwave Thermotherapy    OTHER SURGICAL HISTORY  11/14/2018    Esophagogastroduodenoscopy    OTHER SURGICAL HISTORY  11/14/2018    Colonoscopy complete for polypectomy        Social History  He reports that he has quit smoking. His smoking use included cigarettes. He has a 20 pack-year smoking history. He has never used smokeless tobacco. He reports that he does not currently use alcohol. He reports that he does not use drugs.    Family History  Family  History   Problem Relation Name Age of Onset    Liver cancer Father          Allergies  Patient has no known allergies.    Review of Systems   Constitutional: Negative.    HENT: Negative.     Respiratory: Negative.     Cardiovascular: Negative.    Gastrointestinal: Negative.    Genitourinary:  Positive for decreased urine volume, difficulty urinating and urgency.   Musculoskeletal: Negative.    Neurological: Negative.         Physical Exam  Vitals reviewed.   Constitutional:       Appearance: Normal appearance. He is obese.   HENT:      Head: Normocephalic and atraumatic.   Cardiovascular:      Rate and Rhythm: Normal rate and regular rhythm.   Pulmonary:      Effort: Pulmonary effort is normal.      Breath sounds: Normal breath sounds.   Abdominal:      Tenderness: There is no abdominal tenderness. There is no guarding.   Neurological:      Mental Status: He is alert.          Last Recorded Vitals  37.6 - 78 - 121/67 - 18 - 95% RA    Relevant Results  CT UROGRAM  From 8/6/24 independently reviewed. Very large prostate. Scattered atherosclerosis, not particularly heavy in the IIA, especially for age.    Assessment/Plan   Assessment & Plan  Enlarged prostate with urinary obstruction    I had a discussion with the patient and his daughter regarding the nature of prostate artery embolization and how it may be used to treat the patient's with symptomatic benign prostatic hypertrophy manifesting with lower urinary tract symptoms, (LUTS), such as straining, hesitancy, weak stream and urgency. I discussed the procedure itself with the patient, including femoral artery access, the use iodinated contrast for pelvic angiography, and the embolization portion of the procedure. I included a discussion of the risks of the procedure, which included but was not limited to, the risk of arterial injury, contrast induced nephropathy, and off target embolization. Off target embolization could include, but is not limited to embolization  of the bladder, rectum, and penis. We also discussed the possibility of urinary retention potentially requiring use of a Vergara's catheter and postprocedure urinary tract infection for which the patient will be given antibiotics prophylactically. Self limited hematuria or hematochezia may be encountered. I discussed the post embolization syndrome with the patient and our follow up procedures to manage this.      I discussed with the patient the efficacy of the procedure and how the prostate artery embolization procedure is shown to significantly decrease IPSS scores on population level data, but that the individual effect of PAE can range from no effect to dramatic improvement. The patient will require followup with his ordering urologist to asses the need for additional surgical procedures.        I spent 60 minutes in the professional and overall care of this patient.      Vahid Ly MD

## 2024-11-29 ENCOUNTER — PREP FOR PROCEDURE (OUTPATIENT)
Dept: RADIOLOGY | Facility: HOSPITAL | Age: 84
End: 2024-11-29
Payer: COMMERCIAL

## 2024-11-29 DIAGNOSIS — N13.8 ENLARGED PROSTATE WITH URINARY OBSTRUCTION: Primary | ICD-10-CM

## 2024-11-29 DIAGNOSIS — N40.1 ENLARGED PROSTATE WITH URINARY OBSTRUCTION: Primary | ICD-10-CM

## 2024-12-06 ENCOUNTER — APPOINTMENT (OUTPATIENT)
Dept: ORTHOPEDIC SURGERY | Facility: CLINIC | Age: 84
End: 2024-12-06
Payer: COMMERCIAL

## 2024-12-12 ENCOUNTER — APPOINTMENT (OUTPATIENT)
Dept: OPHTHALMOLOGY | Facility: CLINIC | Age: 84
End: 2024-12-12
Payer: MEDICARE

## 2024-12-12 PROBLEM — H35.363 DRUSEN STAGE MACULAR DEGENERATION OF BOTH EYES: Status: ACTIVE | Noted: 2023-05-16

## 2024-12-26 DIAGNOSIS — E78.2 MIXED HYPERLIPIDEMIA: ICD-10-CM

## 2024-12-30 RX ORDER — ATORVASTATIN CALCIUM 20 MG/1
20 TABLET, FILM COATED ORAL DAILY
Qty: 90 TABLET | Refills: 0 | Status: SHIPPED | OUTPATIENT
Start: 2024-12-30

## 2025-01-03 ENCOUNTER — APPOINTMENT (OUTPATIENT)
Dept: ORTHOPEDIC SURGERY | Facility: CLINIC | Age: 85
End: 2025-01-03
Payer: COMMERCIAL

## 2025-01-09 ENCOUNTER — APPOINTMENT (OUTPATIENT)
Dept: PRIMARY CARE | Facility: CLINIC | Age: 85
End: 2025-01-09
Payer: COMMERCIAL

## 2025-01-16 ENCOUNTER — APPOINTMENT (OUTPATIENT)
Dept: OPHTHALMOLOGY | Facility: CLINIC | Age: 85
End: 2025-01-16
Payer: COMMERCIAL

## 2025-01-18 DIAGNOSIS — I10 PRIMARY HYPERTENSION: ICD-10-CM

## 2025-01-18 RX ORDER — LISINOPRIL AND HYDROCHLOROTHIAZIDE 12.5; 2 MG/1; MG/1
1 TABLET ORAL DAILY
Qty: 100 TABLET | Refills: 0 | Status: SHIPPED | OUTPATIENT
Start: 2025-01-18 | End: 2025-04-28

## 2025-01-23 ENCOUNTER — APPOINTMENT (OUTPATIENT)
Dept: RADIOLOGY | Facility: HOSPITAL | Age: 85
End: 2025-01-23
Payer: COMMERCIAL

## 2025-01-31 ENCOUNTER — APPOINTMENT (OUTPATIENT)
Dept: ORTHOPEDIC SURGERY | Facility: CLINIC | Age: 85
End: 2025-01-31
Payer: COMMERCIAL

## 2025-01-31 ENCOUNTER — HOSPITAL ENCOUNTER (OUTPATIENT)
Dept: RADIOLOGY | Facility: HOSPITAL | Age: 85
Discharge: HOME | End: 2025-01-31
Payer: COMMERCIAL

## 2025-01-31 DIAGNOSIS — M25.562 CHRONIC PAIN OF LEFT KNEE: ICD-10-CM

## 2025-01-31 DIAGNOSIS — G89.29 CHRONIC PAIN OF LEFT KNEE: ICD-10-CM

## 2025-01-31 PROCEDURE — 73564 X-RAY EXAM KNEE 4 OR MORE: CPT | Mod: LT

## 2025-01-31 NOTE — PROGRESS NOTES
Chief Complaint   Patient presents with    Left Knee - Pain, New Patient Visit       History of Present Illness  Shashi is an 85-year-old male presenting today with daughter as a new patient for initial orthopedic evaluation of his left knee.  Patient states that he has had persistent ongoing pain coming and going in severity over the last few months.  Did most recently have a fall this past week in which he slipped on his socks on his floor and did land directly onto his left knee.  Was able to ambulate and bear weight following this.  Has progressively gotten better over the last week.  Overall pain is minimal today.  Is presenting today for reassurance.      Review of Systems   GENERAL: Negative for malaise, significant weight loss, fever  MUSCULOSKELETAL: see HPI  NEURO:  Negative     Exam  Left knee:  Skin healthy and intact  No gross swelling  Small area of ecchymosis measuring approximately 5 cm x 4 cm about the suprapatellar anterior lateral knee.  Alignment: Neutral      effusion: Mild        ROM: Full  Crepitance with range of motion  No pain with internal rotation of the hip  Minimal tenderness to palpation over medial and lateral joint line and with patellar compression      No laxity to valgus stress  No laxity to varus stress  Negative Lachman´s test  Negative posterior drawer test  Mild pain with Lenin´s test  Quadriceps mechanism intact  Quadriceps tendon palpable throughout its course with no obvious deformity  Patellar tendon palpable throughout its course with no obvious deformity  Neurovascular exam normal distally  2+ DP pulse and good cap refill     Radiographs  Multiple view left knee radiographs were ordered, performed and interpreted today.  No obvious fracture or dislocation present there is moderate sclerosis, osteophytosis, mild joint space narrowing particularly about the medial compartment consistent with moderate knee arthritis, varus wear pattern.  There is also  chondrocalcinosis present.  No lytic or blastic lesions noted.  No other acute osseous abnormality appreciated    Procedures     Assessment  Patient with left knee contusion in the setting of moderate knee osteoarthritis     Plan  We discussed with the patient the diagnosis of degenerative joint disease of the knee.  We reviewed an evidence-based approach to osteoarthritis of the knee.  We discussed oral anti-inflammatories, cortisone injections, gel injections, ultimately total knee arthroplasty as well as risk benefits contraindications alternatives to each.  We also discussed the incorporation of physical therapy and over-the-counter bracing for support  We strongly encouraged low-impact aerobic activity and non-opioid analgesics.     We discussed temporary pain relief with corticosteroid injections and the associated risks.  We also discussed the conflicting evidence regarding viscosupplementation and potential long-term risks with NSAID´s.  We reviewed the role of bracing for instability and physical therapy for atrophy and gait abnormalities.  The patient elected for conservative management consisting of over-the-counter anti-inflammatories.  Patient will follow-up in 2 months if pain persists or worsens    Judson Lino PA-C     In a face to face encounter, I evaluated the patient and performed a physical examination, discussed pertinent diagnostic studies if indicated and discussed diagnosis and management strategies with both the patient and physician assistant / nurse practitioner.  I reviewed the PA/NP's note and agree with the documented findings and plan of care.     85-year-old male that sustained a fall resulting in left knee pain he has had a history of prior knee pain however much worsened after the fall.  With past several days his pain is improving ambulating independently.  We did obtain x-rays in clinic today my interpretation as follows no acute osseous abnormality no fracture dislocation  appreciated there is some chondrocalcinosis about the medial lateral joint lines consistent with pseudogout in setting of moderate arthritis.  He does have crepitus with range of motion of his knee also ecchymosis about the superior pole of the patella able to form straight leg raise ambulating independently.  Risk-benefit alternative treatment discussed with Shashi and daughter today using shared inform decision making he does wish to proceed with continued expectant management.  Discussed activities to avoid using pain as a guide.  He will follow-up in 2 months if he is having any issues.  Discussed use of cortisone injection if he wants to if he does have worsening knee pain.      Иван Duffy III, MD

## 2025-02-04 ENCOUNTER — TELEPHONE (OUTPATIENT)
Dept: PRIMARY CARE | Facility: CLINIC | Age: 85
End: 2025-02-04
Payer: COMMERCIAL

## 2025-02-05 DIAGNOSIS — G47.33 OBSTRUCTIVE SLEEP APNEA SYNDROME: Primary | ICD-10-CM

## 2025-02-06 NOTE — TELEPHONE ENCOUNTER
Please verify where the order needs to go.   He was getting supplies from [facility], but not sure is that is where he is still getting them.

## 2025-02-20 ENCOUNTER — APPOINTMENT (OUTPATIENT)
Dept: OPHTHALMOLOGY | Facility: CLINIC | Age: 85
End: 2025-02-20
Payer: COMMERCIAL

## 2025-02-27 ENCOUNTER — HOSPITAL ENCOUNTER (OUTPATIENT)
Dept: RADIOLOGY | Facility: HOSPITAL | Age: 85
Discharge: HOME | End: 2025-02-27
Payer: COMMERCIAL

## 2025-02-27 VITALS
HEIGHT: 67 IN | HEART RATE: 63 BPM | OXYGEN SATURATION: 95 % | SYSTOLIC BLOOD PRESSURE: 157 MMHG | TEMPERATURE: 97.5 F | WEIGHT: 235 LBS | RESPIRATION RATE: 16 BRPM | BODY MASS INDEX: 36.88 KG/M2 | DIASTOLIC BLOOD PRESSURE: 100 MMHG

## 2025-02-27 DIAGNOSIS — R97.20 INCREASED PROSTATE SPECIFIC ANTIGEN (PSA) VELOCITY: Primary | ICD-10-CM

## 2025-02-27 DIAGNOSIS — N13.8 ENLARGED PROSTATE WITH URINARY OBSTRUCTION: ICD-10-CM

## 2025-02-27 DIAGNOSIS — N40.1 ENLARGED PROSTATE WITH URINARY OBSTRUCTION: ICD-10-CM

## 2025-02-27 LAB
ANION GAP SERPL CALC-SCNC: 14 MMOL/L (ref 10–20)
BUN SERPL-MCNC: 20 MG/DL (ref 6–23)
CALCIUM SERPL-MCNC: 9.8 MG/DL (ref 8.6–10.3)
CHLORIDE SERPL-SCNC: 104 MMOL/L (ref 98–107)
CO2 SERPL-SCNC: 25 MMOL/L (ref 21–32)
CREAT SERPL-MCNC: 1.09 MG/DL (ref 0.5–1.3)
EGFRCR SERPLBLD CKD-EPI 2021: 66 ML/MIN/1.73M*2
ERYTHROCYTE [DISTWIDTH] IN BLOOD BY AUTOMATED COUNT: 12.7 % (ref 11.5–14.5)
GLUCOSE SERPL-MCNC: 139 MG/DL (ref 74–99)
HCT VFR BLD AUTO: 43.9 % (ref 41–52)
HGB BLD-MCNC: 14.8 G/DL (ref 13.5–17.5)
INR PPP: 1 (ref 0.9–1.1)
MCH RBC QN AUTO: 32.1 PG (ref 26–34)
MCHC RBC AUTO-ENTMCNC: 33.7 G/DL (ref 32–36)
MCV RBC AUTO: 95 FL (ref 80–100)
NRBC BLD-RTO: 0 /100 WBCS (ref 0–0)
PLATELET # BLD AUTO: 237 X10*3/UL (ref 150–450)
POTASSIUM SERPL-SCNC: 3.9 MMOL/L (ref 3.5–5.3)
PROTHROMBIN TIME: 10.8 SECONDS (ref 9.8–12.4)
RBC # BLD AUTO: 4.61 X10*6/UL (ref 4.5–5.9)
SODIUM SERPL-SCNC: 139 MMOL/L (ref 136–145)
WBC # BLD AUTO: 9.7 X10*3/UL (ref 4.4–11.3)

## 2025-02-27 PROCEDURE — 80048 BASIC METABOLIC PNL TOTAL CA: CPT | Performed by: RADIOLOGY

## 2025-02-27 PROCEDURE — 2550000001 HC RX 255 CONTRASTS: Performed by: RADIOLOGY

## 2025-02-27 PROCEDURE — 99153 MOD SED SAME PHYS/QHP EA: CPT | Performed by: RADIOLOGY

## 2025-02-27 PROCEDURE — 76377 3D RENDER W/INTRP POSTPROCES: CPT | Mod: LT | Performed by: RADIOLOGY

## 2025-02-27 PROCEDURE — 7100000010 HC PHASE TWO TIME - EACH INCREMENTAL 1 MINUTE

## 2025-02-27 PROCEDURE — 7100000009 HC PHASE TWO TIME - INITIAL BASE CHARGE

## 2025-02-27 PROCEDURE — 77001 FLUOROGUIDE FOR VEIN DEVICE: CPT | Mod: LT | Performed by: RADIOLOGY

## 2025-02-27 PROCEDURE — C1760 CLOSURE DEV, VASC: HCPCS

## 2025-02-27 PROCEDURE — 85027 COMPLETE CBC AUTOMATED: CPT | Performed by: RADIOLOGY

## 2025-02-27 PROCEDURE — 99152 MOD SED SAME PHYS/QHP 5/>YRS: CPT | Performed by: RADIOLOGY

## 2025-02-27 PROCEDURE — 85610 PROTHROMBIN TIME: CPT | Performed by: RADIOLOGY

## 2025-02-27 PROCEDURE — 36415 COLL VENOUS BLD VENIPUNCTURE: CPT | Performed by: RADIOLOGY

## 2025-02-27 PROCEDURE — C1769 GUIDE WIRE: HCPCS

## 2025-02-27 PROCEDURE — 75726 ARTERY X-RAYS ABDOMEN: CPT | Mod: LT | Performed by: RADIOLOGY

## 2025-02-27 PROCEDURE — C1887 CATHETER, GUIDING: HCPCS

## 2025-02-27 PROCEDURE — 37243 VASC EMBOLIZE/OCCLUDE ORGAN: CPT | Mod: RT | Performed by: RADIOLOGY

## 2025-02-27 PROCEDURE — 99152 MOD SED SAME PHYS/QHP 5/>YRS: CPT

## 2025-02-27 PROCEDURE — 2500000004 HC RX 250 GENERAL PHARMACY W/ HCPCS (ALT 636 FOR OP/ED): Performed by: STUDENT IN AN ORGANIZED HEALTH CARE EDUCATION/TRAINING PROGRAM

## 2025-02-27 PROCEDURE — 2720000007 HC OR 272 NO HCPCS

## 2025-02-27 PROCEDURE — 36247 INS CATH ABD/L-EXT ART 3RD: CPT | Performed by: RADIOLOGY

## 2025-02-27 PROCEDURE — 76937 US GUIDE VASCULAR ACCESS: CPT | Mod: RT | Performed by: RADIOLOGY

## 2025-02-27 PROCEDURE — 2500000004 HC RX 250 GENERAL PHARMACY W/ HCPCS (ALT 636 FOR OP/ED): Performed by: RADIOLOGY

## 2025-02-27 PROCEDURE — 2780000003 HC OR 278 NO HCPCS

## 2025-02-27 PROCEDURE — 99153 MOD SED SAME PHYS/QHP EA: CPT

## 2025-02-27 PROCEDURE — 2500000005 HC RX 250 GENERAL PHARMACY W/O HCPCS: Performed by: RADIOLOGY

## 2025-02-27 PROCEDURE — 75710 ARTERY X-RAYS ARM/LEG: CPT | Mod: RT | Performed by: RADIOLOGY

## 2025-02-27 RX ORDER — FENTANYL CITRATE 50 UG/ML
INJECTION, SOLUTION INTRAMUSCULAR; INTRAVENOUS
Status: COMPLETED | OUTPATIENT
Start: 2025-02-27 | End: 2025-02-27

## 2025-02-27 RX ORDER — MEPERIDINE HYDROCHLORIDE 50 MG/ML
12.5 INJECTION INTRAMUSCULAR; INTRAVENOUS; SUBCUTANEOUS EVERY 10 MIN PRN
Status: DISCONTINUED | OUTPATIENT
Start: 2025-02-27 | End: 2025-02-28 | Stop reason: HOSPADM

## 2025-02-27 RX ORDER — ONDANSETRON HYDROCHLORIDE 2 MG/ML
INJECTION, SOLUTION INTRAVENOUS
Status: COMPLETED | OUTPATIENT
Start: 2025-02-27 | End: 2025-02-27

## 2025-02-27 RX ORDER — FAMOTIDINE 10 MG/ML
20 INJECTION, SOLUTION INTRAVENOUS ONCE
Status: DISCONTINUED | OUTPATIENT
Start: 2025-02-27 | End: 2025-02-28 | Stop reason: HOSPADM

## 2025-02-27 RX ORDER — LIDOCAINE HYDROCHLORIDE 10 MG/ML
0.1 INJECTION, SOLUTION EPIDURAL; INFILTRATION; INTRACAUDAL; PERINEURAL ONCE
Status: DISCONTINUED | OUTPATIENT
Start: 2025-02-27 | End: 2025-02-28 | Stop reason: HOSPADM

## 2025-02-27 RX ORDER — MIDAZOLAM HYDROCHLORIDE 1 MG/ML
INJECTION, SOLUTION INTRAMUSCULAR; INTRAVENOUS
Status: COMPLETED | OUTPATIENT
Start: 2025-02-27 | End: 2025-02-27

## 2025-02-27 RX ORDER — HYDROMORPHONE HYDROCHLORIDE 1 MG/ML
0.4 INJECTION, SOLUTION INTRAMUSCULAR; INTRAVENOUS; SUBCUTANEOUS EVERY 2 HOUR PRN
Status: DISCONTINUED | OUTPATIENT
Start: 2025-02-27 | End: 2025-02-28 | Stop reason: HOSPADM

## 2025-02-27 RX ORDER — CIPROFLOXACIN 2 MG/ML
INJECTION, SOLUTION INTRAVENOUS CONTINUOUS PRN
Status: COMPLETED | OUTPATIENT
Start: 2025-02-27 | End: 2025-02-27

## 2025-02-27 RX ORDER — LIDOCAINE HYDROCHLORIDE 10 MG/ML
INJECTION, SOLUTION EPIDURAL; INFILTRATION; INTRACAUDAL; PERINEURAL
Status: COMPLETED | OUTPATIENT
Start: 2025-02-27 | End: 2025-02-27

## 2025-02-27 RX ORDER — ONDANSETRON HYDROCHLORIDE 2 MG/ML
4 INJECTION, SOLUTION INTRAVENOUS ONCE AS NEEDED
Status: DISCONTINUED | OUTPATIENT
Start: 2025-02-27 | End: 2025-02-28 | Stop reason: HOSPADM

## 2025-02-27 RX ORDER — CIPROFLOXACIN 500 MG/1
500 TABLET ORAL 2 TIMES DAILY
Qty: 10 TABLET | Refills: 0 | Status: SHIPPED | OUTPATIENT
Start: 2025-02-27 | End: 2025-03-04

## 2025-02-27 RX ORDER — NITROGLYCERIN 20 MG/100ML
INJECTION INTRAVENOUS CONTINUOUS PRN
Status: COMPLETED | OUTPATIENT
Start: 2025-02-27 | End: 2025-02-27

## 2025-02-27 RX ADMIN — LIDOCAINE HYDROCHLORIDE 10 ML: 10 INJECTION, SOLUTION EPIDURAL; INFILTRATION; INTRACAUDAL; PERINEURAL at 09:23

## 2025-02-27 RX ADMIN — MIDAZOLAM 1 MG: 1 INJECTION INTRAMUSCULAR; INTRAVENOUS at 09:21

## 2025-02-27 RX ADMIN — FENTANYL CITRATE 50 MCG: 50 INJECTION, SOLUTION INTRAMUSCULAR; INTRAVENOUS at 09:21

## 2025-02-27 RX ADMIN — FENTANYL CITRATE 50 MCG: 50 INJECTION, SOLUTION INTRAMUSCULAR; INTRAVENOUS at 10:27

## 2025-02-27 RX ADMIN — Medication 3 L/MIN: at 08:59

## 2025-02-27 RX ADMIN — ONDANSETRON 4 MG: 2 INJECTION, SOLUTION INTRAMUSCULAR; INTRAVENOUS at 11:51

## 2025-02-27 RX ADMIN — NITROGLYCERIN 100 MCG: 20 INJECTION INTRAVENOUS at 10:14

## 2025-02-27 RX ADMIN — FENTANYL CITRATE 50 MCG: 50 INJECTION, SOLUTION INTRAMUSCULAR; INTRAVENOUS at 11:42

## 2025-02-27 RX ADMIN — CIPROFLOXACIN 400 MG: 400 INJECTION, SOLUTION INTRAVENOUS at 09:27

## 2025-02-27 RX ADMIN — IOHEXOL 85 ML: 350 INJECTION, SOLUTION INTRAVENOUS at 11:53

## 2025-02-27 ASSESSMENT — PAIN SCALES - GENERAL
PAINLEVEL_OUTOF10: 0 - NO PAIN
PAINLEVEL_OUTOF10: 4
PAINLEVEL_OUTOF10: 0 - NO PAIN
PAINLEVEL_OUTOF10: 3
PAINLEVEL_OUTOF10: 0 - NO PAIN

## 2025-02-27 ASSESSMENT — PAIN - FUNCTIONAL ASSESSMENT: PAIN_FUNCTIONAL_ASSESSMENT: 0-10

## 2025-02-27 NOTE — DISCHARGE INSTRUCTIONS
CARDIAC CATHETERIZATION DISCHARGE INSTRUCTIONS     FOR SUDDEN AND SEVERE CHEST PAIN, SHORTNESS OF BREATH, EXCESSIVE BLEEDING, SIGNS OF STROKE, OR CHANGES IN MENTAL STATUS YOU SHOULD CALL 911 IMMEDIATELY.     If your provider has prescribed aspirin and/or clopidogrel (Plavix), or prasugrel (Effient), or ticagrelor (Brilinta), DO NOT STOP THESE MEDICATIONS for any reason without talking to your cardiologist first. If any of these were prescribed, you must take them every day without missing a single dose. If you are getting low on these medications, contact your provider immediately for a refill.     FOR NEXT 24 HOURS  - Upon discharge, you should return home and rest for the remainder of the day and evening. You do not have to stay on bed rest but should not be very active.  It is recommended a responsible adult be with you for the first 24 hours after the procedure.    - No driving for 24 hours after procedure. Please arrange for someone to drive you home from the hospital today.     - Do not drive, operate machinery, or use power tools for 24 hours after your procedure.     - Do not make any legal decisions for 24 hours after your procedure.     - Do not drink alcoholic beverages for 24 hours after your procedure.    WOUND CARE   *FOR FEMORAL (LEG) ACCESS*  ·      Avoid heavy lifting (over 10 pounds) for 7 days, squatting or excessive bending for 2 days, and strenuous exercise for 7 days.  ·      No submerged bathing, swimming, or hot tubs for the next 7 days, or until fully healed.  ·      Avoid sexual activity for 3-4 days until any groin discomfort has ceased.     - The transparent dressing should be removed from the site 24 hours after the procedure.  Wash the site gently with soap and water. Rinse well and pat dry. Keep the area clean and dry. You may apply a Band-Aid to the site. Avoid lotions, ointments, or powders until fully healed.     - You may shower the day after your procedure.      - It is  normal to notice a small bruise around the puncture site and/or a small grape sized or smaller lump. Any large bruising or large lump warrants a call to the office.     - If bleeding should occur, lay down and apply pressure to the affected area for 10 minutes.  If the bleeding stops notify your physician.  If there is a large amount of bleeding or spurting of blood CALL 911 immediately.  DO NOT drive yourself to the hospital.    - You may experience some tenderness, bruising or minimal inflammation.  If you have any concerns, you may contact the Cath Lab or if any of these symptoms become excessive, contact your cardiologist or go to the emergency room.     OTHER INSTRUCTIONS  - You may take acetaminophen (Tylenol) as directed for discomfort.  If pain is not relieved with acetaminophen (Tylenol), contact your doctor.    - If you notice or experience any of the following, you should notify your doctor or seek medical attention    Change in mental status or weakness in extremities.  Dizziness, light headedness, or feeling faint.  Change in the site where the procedure was performed, such as bleeding or an increased area of bruising or swelling.  Tingling, numbness, pain, or coolness in the leg/arm beyond the site where the procedure was performed.  Signs of infection (i.e. shaking chills, temperature > 100 degrees Fahrenheit, warmth, redness) in the leg/arm area where the procedure was performed.                         Any excessive bleeding    - If you DO NOT have an appointment with your cardiologist within 2-4 weeks following your procedure, please contact their office.

## 2025-02-27 NOTE — Clinical Note
Successful embolization of bilateral prostate arteries. Patient tolerated procedure well, denies pain/nausea. Right groin site free from bleeding/hematoma, dressed with 4'4 and tegaderm cover.

## 2025-02-27 NOTE — Clinical Note
Patient responsive to Zofran. Patient taken back to CCL by this RN for bedside report with site inspection, recovery and discharge. Patient states nausea resolved and denies pain/discomfort. 2+ right pedal pulse noted, 2+ right groin pulse noted by Technologist, color wnl. Right groin site soft and free from bleeding/hematoma.

## 2025-02-27 NOTE — PRE-PROCEDURE NOTE
INTERVENTIONAL RADIOLOGY PRE-PROCEDURE NOTE    Shashi Murray is a 85 y.o. male with PMHx of BPH w/o LUTS who presents to the interventional radiology department for bilateral prostate artery embolization.    Procedure: bilateral prostate artery embolization    Indication for procedure: The encounter diagnosis was Enlarged prostate with urinary obstruction.    Past Medical History:   Diagnosis Date    Benign prostatic hyperplasia without lower urinary tract symptoms     BPH (benign prostatic hypertrophy)    Chronic obstructive pulmonary disease, unspecified 11/07/2022    COPD (chronic obstructive pulmonary disease)    Elevated prostate specific antigen (PSA)     Abnormal PSA    Essential (primary) hypertension 11/07/2022    Hypertension    Personal history of other diseases of the digestive system 11/30/2022    History of Porras's esophagus    Pure hyperglyceridemia 11/07/2022    Hypertriglyceridemia      Past Surgical History:   Procedure Laterality Date    OTHER SURGICAL HISTORY  07/21/2015    Biopsy Of The Prostate Punch    OTHER SURGICAL HISTORY  07/21/2015    Surg Prostate Transureth Dest Tissue Microwave Thermotherapy    OTHER SURGICAL HISTORY  11/14/2018    Esophagogastroduodenoscopy    OTHER SURGICAL HISTORY  11/14/2018    Colonoscopy complete for polypectomy       Relevant Labs:   Lab Results   Component Value Date    CREATININE 1.09 02/27/2025    EGFR 66 02/27/2025    INR 1.0 02/27/2025    PROTIME 10.8 02/27/2025       Planned Sedation/Anesthesia: Moderate    Directed physical examination:    General: Normal appearance, behavior, cognition and NAD  Heart: Heart regular rate and rhythm  Lungs: No increased work of breathing  Abdomen: soft and nontender  Psych: oriented to time, place and person      Current Outpatient Medications:     albuterol 90 mcg/actuation inhaler, Inhale 2 puffs every 4 hours if needed for shortness of breath., Disp: 18 g, Rfl: 1    atorvastatin (Lipitor) 20 mg tablet, Take 1  tablet (20 mg) by mouth once daily. REPLACES THE FENOFIBRATE, Disp: 90 tablet, Rfl: 0    budesonide-formoteroL (Symbicort) 160-4.5 mcg/actuation inhaler, Inhale 2 puffs 2 times a day., Disp: 10.2 g, Rfl: 2    cholecalciferol (Vitamin D-3) 50 MCG (2000 UT) tablet, Take 1 tablet (50 mcg) by mouth once daily., Disp: , Rfl:     ciclopirox (Penlac) 8 % solution, APPLY THIN FILM DAILY TO TOENAILS. REMOVE FILM ONCE A WEEK WITH ISOPROPYL ALCOHOL, Disp: , Rfl:     colchicine 0.6 mg tablet, TAKE 2 TABLETS AT FIRST SIGN OF GOUT FLARE, THEN TAKE 1 TABLET ONE HOUR LATER, Disp: 30 tablet, Rfl: 0    finasteride (Proscar) 5 mg tablet, Take 1 tablet (5 mg) by mouth once daily. Do not crush, chew, or split., Disp: 90 tablet, Rfl: 3    fluocinonide (Lidex) 0.05 % ointment, Apply topically early in the morning.., Disp: , Rfl:     lisinopriL-hydrochlorothiazide 20-12.5 mg tablet, Take 1 tablet by mouth once daily., Disp: 100 tablet, Rfl: 0    omeprazole (PriLOSEC) 40 mg DR capsule, Take 1 capsule (40 mg) by mouth once daily., Disp: 90 capsule, Rfl: 0    phenazopyridine (Pyridium) 200 mg tablet, Take 1 tablet (200 mg) by mouth 3 times a day as needed for bladder spasms., Disp: 6 tablet, Rfl: 0    Current Facility-Administered Medications:     oxygen (O2) therapy, , , Continuous PRN, Vahid Ly MD, Last Rate: 180,000 mL/hr at 02/27/25 0859, 3 L/min at 02/27/25 0859     Mallampati: III (soft and hard palate and base of uvula visible)    ASA Score: ASA 2 - Patient with mild systemic disease with no functional limitations    Benefits, risks and alternatives of procedure and planned sedation have been discussed with the patient and/or their representative. All questions answered and they agree to proceed.     Иван Laguerre MD, PGY-6  Vascular & Interventional Radiology  IR pager: [phone]    NON-Urgent on call weekends and after hours weekdays (5pm - 5am) IR pager: [phone]  Urgent & emergent on call weekends and after hours weekdays  (5pm-7am) IR pager: [phone]

## 2025-02-27 NOTE — POST-PROCEDURE NOTE
Interventional Radiology Post-Procedure Note    Prostate Artery Embolization    Procedure Details:  Technically successful and uncomplicated prostate artery embolization.  Please see PACS for full procedural details.    Patient Tolerance: good  Complications: None    Indication for procedure: The encounter diagnosis was Enlarged prostate with urinary obstruction.    Pre-Procedure Verification and Time Out:  · Procedure Location procedure area   · HUDDLE - Pre-procedure Verification completed   · TIME OUT - Final Verification completed immediately prior to procedure start   · DEBRIEF completed     General Information:  Date/Time of Procedure: 02/27/25 at 11:43 AM  Indication(s): BPH  Findings: See PACS  Procedure performed by: Иван Laguerre MD   Assistant(s): Dr. Vahid Ly MD  Estimated Blood Loss (mL):  15cc  Specimen: No  Informed Consent: written consent obtained    Prep:  Ultrasound Guided Insertion: Yes  Large Drape, Hand Hygiene, Surgical Cap, Surgical Mask, Sterile Gown, Sterile Gloves, Glasses, and Scrubs  Patient Position: Supine  Site Prep: chlorhexidine, draped, usual sterile procedure followed    Anesthesia/Medications:  Procedural Sedation:  Fentanyl: 150 mcg  Versed: 1 mg  1% Lidocaine: 9 mL    Иван Laguerre MD, PGY-6  Interventional Radiology  IR pager: [phone]    NON-Urgent on call weekends and after hours weekdays (5pm - 5am) IR pager: [phone]  Urgent & emergent on call weekends and after hours weekdays (5pm-7am) IR pager: [phone]

## 2025-02-27 NOTE — NURSING NOTE
States nausea gone, sipping ginger ale. Dr Ly notified, amb to bathroom.  Site unchanged, soft no bleeding. Gait steady. Ok to be dc home, instructions reviewed

## 2025-02-27 NOTE — Clinical Note
Patient assisted to cart via slide board, cleaned, external male catheter applied and linens changed.

## 2025-02-27 NOTE — Clinical Note
Patient nauseous when moving from table to cart with small amount  of clear emesis. Dr. Ly messaged for Zofran alicia.

## 2025-03-05 DIAGNOSIS — M1A.09X0 IDIOPATHIC CHRONIC GOUT OF MULTIPLE SITES WITHOUT TOPHUS: ICD-10-CM

## 2025-03-05 RX ORDER — COLCHICINE 0.6 MG/1
TABLET ORAL
Qty: 30 TABLET | Refills: 0 | Status: SHIPPED | OUTPATIENT
Start: 2025-03-05

## 2025-03-24 DIAGNOSIS — E78.2 MIXED HYPERLIPIDEMIA: ICD-10-CM

## 2025-03-25 RX ORDER — ATORVASTATIN CALCIUM 20 MG/1
20 TABLET, FILM COATED ORAL DAILY
Qty: 90 TABLET | Refills: 0 | Status: SHIPPED | OUTPATIENT
Start: 2025-03-25

## 2025-03-28 DIAGNOSIS — M1A.09X0 IDIOPATHIC CHRONIC GOUT OF MULTIPLE SITES WITHOUT TOPHUS: ICD-10-CM

## 2025-03-31 RX ORDER — COLCHICINE 0.6 MG/1
TABLET ORAL
Qty: 30 TABLET | Refills: 0 | Status: SHIPPED | OUTPATIENT
Start: 2025-03-31

## 2025-04-22 DIAGNOSIS — I10 PRIMARY HYPERTENSION: ICD-10-CM

## 2025-04-23 ENCOUNTER — APPOINTMENT (OUTPATIENT)
Dept: PRIMARY CARE | Facility: CLINIC | Age: 85
End: 2025-04-23
Payer: COMMERCIAL

## 2025-04-23 VITALS
DIASTOLIC BLOOD PRESSURE: 99 MMHG | TEMPERATURE: 98.2 F | WEIGHT: 228.2 LBS | SYSTOLIC BLOOD PRESSURE: 137 MMHG | HEART RATE: 97 BPM | BODY MASS INDEX: 35.82 KG/M2 | OXYGEN SATURATION: 90 % | HEIGHT: 67 IN

## 2025-04-23 DIAGNOSIS — J44.9 CHRONIC OBSTRUCTIVE PULMONARY DISEASE, UNSPECIFIED COPD TYPE (MULTI): ICD-10-CM

## 2025-04-23 DIAGNOSIS — E78.2 MIXED HYPERLIPIDEMIA: ICD-10-CM

## 2025-04-23 DIAGNOSIS — I12.9 HYPERTENSIVE KIDNEY DISEASE WITH STAGE 3A CHRONIC KIDNEY DISEASE (MULTI): ICD-10-CM

## 2025-04-23 DIAGNOSIS — R01.1 HEART MURMUR: ICD-10-CM

## 2025-04-23 DIAGNOSIS — R73.01 IMPAIRED FASTING GLUCOSE: ICD-10-CM

## 2025-04-23 DIAGNOSIS — G47.33 OBSTRUCTIVE SLEEP APNEA SYNDROME: ICD-10-CM

## 2025-04-23 DIAGNOSIS — N18.31 HYPERTENSIVE KIDNEY DISEASE WITH STAGE 3A CHRONIC KIDNEY DISEASE (MULTI): ICD-10-CM

## 2025-04-23 DIAGNOSIS — M1A.09X0 IDIOPATHIC CHRONIC GOUT OF MULTIPLE SITES WITHOUT TOPHUS: ICD-10-CM

## 2025-04-23 DIAGNOSIS — I10 PRIMARY HYPERTENSION: Primary | ICD-10-CM

## 2025-04-23 DIAGNOSIS — E66.01 CLASS 2 SEVERE OBESITY WITH SERIOUS COMORBIDITY AND BODY MASS INDEX (BMI) OF 35.0 TO 35.9 IN ADULT, UNSPECIFIED OBESITY TYPE: ICD-10-CM

## 2025-04-23 DIAGNOSIS — E55.9 VITAMIN D DEFICIENCY: ICD-10-CM

## 2025-04-23 DIAGNOSIS — E66.812 CLASS 2 SEVERE OBESITY WITH SERIOUS COMORBIDITY AND BODY MASS INDEX (BMI) OF 35.0 TO 35.9 IN ADULT, UNSPECIFIED OBESITY TYPE: ICD-10-CM

## 2025-04-23 DIAGNOSIS — Z87.19 HISTORY OF BARRETT'S ESOPHAGUS: ICD-10-CM

## 2025-04-23 DIAGNOSIS — K21.9 GASTROESOPHAGEAL REFLUX DISEASE WITHOUT ESOPHAGITIS: ICD-10-CM

## 2025-04-23 PROCEDURE — 1159F MED LIST DOCD IN RCRD: CPT | Performed by: FAMILY MEDICINE

## 2025-04-23 PROCEDURE — G2211 COMPLEX E/M VISIT ADD ON: HCPCS | Performed by: FAMILY MEDICINE

## 2025-04-23 PROCEDURE — 3075F SYST BP GE 130 - 139MM HG: CPT | Performed by: FAMILY MEDICINE

## 2025-04-23 PROCEDURE — 99214 OFFICE O/P EST MOD 30 MIN: CPT | Performed by: FAMILY MEDICINE

## 2025-04-23 PROCEDURE — 3080F DIAST BP >= 90 MM HG: CPT | Performed by: FAMILY MEDICINE

## 2025-04-23 PROCEDURE — 1160F RVW MEDS BY RX/DR IN RCRD: CPT | Performed by: FAMILY MEDICINE

## 2025-04-23 RX ORDER — LISINOPRIL AND HYDROCHLOROTHIAZIDE 12.5; 2 MG/1; MG/1
1 TABLET ORAL DAILY
Qty: 100 TABLET | Refills: 0 | Status: SHIPPED | OUTPATIENT
Start: 2025-04-23 | End: 2025-08-01

## 2025-04-23 RX ORDER — ALBUTEROL SULFATE 90 UG/1
2 INHALANT RESPIRATORY (INHALATION) EVERY 4 HOURS PRN
Qty: 18 G | Refills: 0 | Status: SHIPPED | OUTPATIENT
Start: 2025-04-23

## 2025-04-23 NOTE — PROGRESS NOTES
Subjective   Patient ID: Shashi Murray is a 85 y.o. male who presents for Follow-up.    HPI     Pt's daughter states that the pt needs a hearing aid for his RT ear. They had a audiogram done in 8/20/2024.     Pt's daughter had a procedure back in February for a prostate artery embolization. She states that she was not told to follow up with a urology.     Pt's daughter mentions that the pt had gout really badly in his LT foot back in February. She states the colchicine did not work for him at the time. The gout is currently gone, but he states it is a bit swollen.     No other concerns at this time.    No recent BW  Colon: 12/17/2018    He has HTN.  Patient is compliant with antihypertensive therapy.   He has been compliant with his antihypertensive therapy and denies any noted side effects.  He does not monitor BP at home. He denies CP, SOB, dizziness, and LE edema.     He has hypertriglyceridemia.   Patient is compliant with his fenofibrate.   He denies any noted side effects.   Patient tries to limit the amount of fatty foods and high cholesterol foods in his diet     Patient has impaired fasting glucose.  IHe denies any polyuria, polydipsia, polyphagia.     Patient has COPD.  COPD has remained stable with Symbicort and ProAir as needed.   He denies any SOB above his baseline.   He has not experienced any exacerbations since his last visit.     Patient has GERD and a hx of Porras's esophagus.  His GERD symptoms well controlled with omeprazole OTC.  His last EGD did not reveal previously noted Porras's esophagus and no follow-up EGD was recommended.     Patient has gout.  He is maintained on daily colchicine for gout.  No exacerbations since his last visit here..     He has vitamin D deficiency.  Patient remains compliant with the current dosing of over the counter vitamin D supplement     Patient has obstructive sleep apnea.   He uses CPAP nightly and continues to benefit from use.    Review of  "Systems  Constitutional: Patient denies any fever, chills, loss of appetite, or unexplained weight loss.  Cardiovascular: Patient denies any chest pain, shortness of breath with exertion, tachycardia, palpitations, orthopnea, or paroxysmal nocturnal dyspnea.  Respiratory: Patient denies any cough, shortness breath, or wheezing.  Gastrointestinal: Patient denies any nausea, vomiting, diarrhea, constipation, melena, hematochezia, or reflux symptoms.  Skin: Denies any rashes or skin lesions.   Neurology: Patient denies any new motor or sensory losses.  Denies any numbness, tingling, weakness, and incoordination of the extremities.  Patient also denies any tremor, seizures, or gait instability.  Endocrinology: Denies any polyuria, polydipsia, polyphagia, or heat/cold intolerance.      Objective   BP (!) 137/99   Pulse 97   Temp 36.8 °C (98.2 °F)   Ht 1.702 m (5' 7\")   Wt 104 kg (228 lb 3.2 oz)   SpO2 90%   BMI 35.74 kg/m²     Physical Exam  General Appearance: Alert and cooperative, in no acute distress, well-developed/well-nourished.  Neck: Supple and without adenopathy or rigidity.  There is no JVD at 90° and no carotid bruits are noted.  There is no thyromegaly, thyroid tenderness, or palpable thyroid nodules.    Heart: Regular rate and rhythm with a grade 2-3/6 murmur at the RSB and no ectopy.    Respiratory: Lungs are clear to auscultation bilaterally with good air exchange.  Good respiratory effort and no accessory muscle use.  Skin: Good turgor, moist, warm and without rashes or lesions.  Neurological exam: Alert and oriented ×3, no tremor, normal gait.  Extremities: No clubbing, cyanosis, or edema      Assessment/Plan     HTN:   Stable on in office readings.  Blood pressure appears adequately controlled and we will continue with the current antihypertensive therapy.     Hyperlipidemia: Patient will continue with the current medication. Dietary changes, exercise, and maintenance of healthy weight were " discussed at length.  Lab Results   Component Value Date    CHOL 213 (H) 03/28/2024    HDL 47.1 03/28/2024    LDLCALC 141 (H) 03/28/2024    TRIG 123 03/28/2024    CHHDL 4.5 03/28/2024    VLDL 25 03/28/2024    NHDL 166 (H) 03/28/2024          GERD / Hx of Hopson's esophagus: His symptoms well controlled with omeprazole.   He has a history of HOPSON'S ESOPHAGUS that had resolved on last EGD.   No further egd recommended by his specialist.  Pt to continue on the omeprazole.     COPD: Stable based on symptoms.   Pt is to continue Symbicort and as needed ProAir.      GOUT: Stable based on symptoms.  Patient denies any recent exacerbations.  Pt will use meloxicam as needed for any exacerbations.     Vitamin D deficiency: Stable based on last labs.  He remains compliant with the current dosing of over the counter vitamin D supplementation and he will continue with the same.  Lab Results   Component Value Date    VITD25 52 07/31/2023    VITD25 45 11/23/2021    VITD25 44 03/19/2021          Impaired fasting glucose:  Stable based on labs.  His most recent A1c was:  Lab Results   Component Value Date    HGBA1C 5.8 (H) 03/28/2024   Dietary changes, exercise, and maintenance of a healthy weight were discussed at length.   We will continue to monitor.  Will recheck his A1c on next labs.     Sleep Apnea: Stable based on symptoms.  He continues to use his CPAP on a nightly basis for well over 4 hours per night.  He continues to benefit from the CPAP therapy.  Will order new CPAP supplies.      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  We will continue to monitor.   Pt has been advised to avoid NSAIDs.     Obesity:   Dietary changes, exercise, and maintenance of a healthy weight were discussed at length.     Increased PSA velocity:    Lab Results  Component Value Date    PSA 6.4 (H) 03/28/2024  PSA has increased--->3.9, 5.29, 6.4  We referred to urology for evaluation and he was seen by Dr. Donato 7/11/24.  Underwent a  cystoscopy 9/5/2024.  Was found to have massive BPH with signs of bladder damage.  Did undergo procedure with urology.     Heart murmur:  Cardiogram was ordered for further evaluation.      MCAW DUE 7/2025    Follow up in 3 months.       Scribe Attestation  By signing my name below, IRenetta Scribe   attest that this documentation has been prepared under the direction and in the presence of Waylon Nogueira DO.

## 2025-04-23 NOTE — PATIENT INSTRUCTIONS
Follow up in 3 months with labs to be done PRIOR.    It was a pleasure to see you today. Thank you for choosing us for your health care needs.    If you have lab or other testing completed and have not been informed of results within one week, please call the office for your results.    If you haven't done so, consider signing up for [facility] [employer], the [facility] personal health record. Ask the staff how you can get started.

## 2025-05-02 ENCOUNTER — APPOINTMENT (OUTPATIENT)
Dept: UROLOGY | Facility: CLINIC | Age: 85
End: 2025-05-02
Payer: COMMERCIAL

## 2025-05-14 ENCOUNTER — HOSPITAL ENCOUNTER (OUTPATIENT)
Dept: CARDIOLOGY | Facility: CLINIC | Age: 85
Discharge: HOME | End: 2025-05-14
Payer: COMMERCIAL

## 2025-05-14 DIAGNOSIS — R01.1 HEART MURMUR: ICD-10-CM

## 2025-05-14 PROCEDURE — 93306 TTE W/DOPPLER COMPLETE: CPT

## 2025-05-14 PROCEDURE — 93306 TTE W/DOPPLER COMPLETE: CPT | Performed by: INTERNAL MEDICINE

## 2025-05-15 ENCOUNTER — TELEPHONE (OUTPATIENT)
Dept: PRIMARY CARE | Facility: CLINIC | Age: 85
End: 2025-05-15
Payer: COMMERCIAL

## 2025-05-15 LAB
AORTIC VALVE MEAN GRADIENT: 20 MMHG
AORTIC VALVE PEAK VELOCITY: 3.02 M/S
AV PEAK GRADIENT: 36 MMHG
AVA (PEAK VEL): 1.02 CM2
AVA (VTI): 1.03 CM2
EJECTION FRACTION APICAL 4 CHAMBER: 59.8
EJECTION FRACTION: 63 %
LEFT VENTRICLE INTERNAL DIMENSION DIASTOLE: 6.5 CM (ref 3.5–6)
LEFT VENTRICULAR OUTFLOW TRACT DIAMETER: 1.9 CM
LV EJECTION FRACTION BIPLANE: 63 %
MITRAL VALVE E/A RATIO: 0.8
MITRAL VALVE E/E' RATIO: 10.9
RIGHT VENTRICLE PEAK SYSTOLIC PRESSURE: 31.1 MMHG

## 2025-05-15 NOTE — TELEPHONE ENCOUNTER
I spoke with Medical service company in which they stated all the patient has to do is call and say that he wants to place an order so they can ship the supply and CPAP machine to them.     I also spoke with the daughter Storm and was informed of all this and have given contact information.

## 2025-05-30 DIAGNOSIS — M1A.09X0 IDIOPATHIC CHRONIC GOUT OF MULTIPLE SITES WITHOUT TOPHUS: ICD-10-CM

## 2025-06-02 RX ORDER — COLCHICINE 0.6 MG/1
TABLET ORAL
Qty: 30 TABLET | Refills: 0 | Status: SHIPPED | OUTPATIENT
Start: 2025-06-02

## 2025-06-16 DIAGNOSIS — I10 PRIMARY HYPERTENSION: ICD-10-CM

## 2025-06-16 RX ORDER — BUDESONIDE AND FORMOTEROL FUMARATE DIHYDRATE 160; 4.5 UG/1; UG/1
2 AEROSOL RESPIRATORY (INHALATION)
Qty: 10.2 G | Refills: 0 | Status: SHIPPED | OUTPATIENT
Start: 2025-06-16

## 2025-06-23 DIAGNOSIS — E78.2 MIXED HYPERLIPIDEMIA: ICD-10-CM

## 2025-06-23 RX ORDER — ATORVASTATIN CALCIUM 20 MG/1
20 TABLET, FILM COATED ORAL DAILY
Qty: 90 TABLET | Refills: 0 | Status: SHIPPED | OUTPATIENT
Start: 2025-06-23

## 2025-06-24 DIAGNOSIS — I10 PRIMARY HYPERTENSION: ICD-10-CM

## 2025-06-24 RX ORDER — ALBUTEROL SULFATE 90 UG/1
2 INHALANT RESPIRATORY (INHALATION) EVERY 4 HOURS PRN
Qty: 18 G | Refills: 0 | Status: SHIPPED | OUTPATIENT
Start: 2025-06-24

## 2025-06-30 DIAGNOSIS — R31.0 GROSS HEMATURIA: ICD-10-CM

## 2025-06-30 DIAGNOSIS — R97.20 INCREASED PROSTATE SPECIFIC ANTIGEN (PSA) VELOCITY: ICD-10-CM

## 2025-06-30 RX ORDER — FINASTERIDE 5 MG/1
5 TABLET, FILM COATED ORAL DAILY
Qty: 90 TABLET | Refills: 0 | Status: SHIPPED | OUTPATIENT
Start: 2025-06-30 | End: 2026-06-30

## 2025-07-15 PROBLEM — I35.0 AORTIC STENOSIS: Status: ACTIVE | Noted: 2025-07-15

## 2025-07-22 LAB
25(OH)D3+25(OH)D2 SERPL-MCNC: 47 NG/ML (ref 30–100)
ALBUMIN SERPL-MCNC: 3.9 G/DL (ref 3.6–5.1)
ALP SERPL-CCNC: 86 U/L (ref 35–144)
ALT SERPL-CCNC: 20 U/L (ref 9–46)
ANION GAP SERPL CALCULATED.4IONS-SCNC: 11 MMOL/L (CALC) (ref 7–17)
AST SERPL-CCNC: 24 U/L (ref 10–35)
BILIRUB SERPL-MCNC: 0.8 MG/DL (ref 0.2–1.2)
BUN SERPL-MCNC: 13 MG/DL (ref 7–25)
CALCIUM SERPL-MCNC: 9 MG/DL (ref 8.6–10.3)
CHLORIDE SERPL-SCNC: 101 MMOL/L (ref 98–110)
CHOLEST SERPL-MCNC: 158 MG/DL
CHOLEST/HDLC SERPL: 3.3 (CALC)
CO2 SERPL-SCNC: 30 MMOL/L (ref 20–32)
CREAT SERPL-MCNC: 0.96 MG/DL (ref 0.7–1.22)
EGFRCR SERPLBLD CKD-EPI 2021: 77 ML/MIN/1.73M2
EST. AVERAGE GLUCOSE BLD GHB EST-MCNC: 140 MG/DL
EST. AVERAGE GLUCOSE BLD GHB EST-SCNC: 7.7 MMOL/L
GLUCOSE SERPL-MCNC: 115 MG/DL (ref 65–99)
HBA1C MFR BLD: 6.5 %
HDLC SERPL-MCNC: 48 MG/DL
LDLC SERPL CALC-MCNC: 88 MG/DL (CALC)
NONHDLC SERPL-MCNC: 110 MG/DL (CALC)
POTASSIUM SERPL-SCNC: 3.8 MMOL/L (ref 3.5–5.3)
PROT SERPL-MCNC: 6.4 G/DL (ref 6.1–8.1)
SODIUM SERPL-SCNC: 142 MMOL/L (ref 135–146)
TRIGL SERPL-MCNC: 122 MG/DL

## 2025-07-23 DIAGNOSIS — R73.01 IMPAIRED FASTING GLUCOSE: ICD-10-CM

## 2025-07-23 DIAGNOSIS — E78.2 MIXED HYPERLIPIDEMIA: ICD-10-CM

## 2025-07-23 DIAGNOSIS — I10 PRIMARY HYPERTENSION: ICD-10-CM

## 2025-07-23 DIAGNOSIS — E55.9 VITAMIN D DEFICIENCY: ICD-10-CM

## 2025-07-24 ENCOUNTER — APPOINTMENT (OUTPATIENT)
Dept: PRIMARY CARE | Facility: CLINIC | Age: 85
End: 2025-07-24
Payer: COMMERCIAL

## 2025-07-24 VITALS
SYSTOLIC BLOOD PRESSURE: 134 MMHG | HEIGHT: 67 IN | BODY MASS INDEX: 36.18 KG/M2 | HEART RATE: 82 BPM | DIASTOLIC BLOOD PRESSURE: 78 MMHG | WEIGHT: 230.5 LBS | TEMPERATURE: 98.4 F | OXYGEN SATURATION: 91 %

## 2025-07-24 DIAGNOSIS — Z00.00 WELL ADULT EXAM: ICD-10-CM

## 2025-07-24 DIAGNOSIS — Z87.19 HISTORY OF BARRETT'S ESOPHAGUS: ICD-10-CM

## 2025-07-24 DIAGNOSIS — N18.31 HYPERTENSIVE KIDNEY DISEASE WITH STAGE 3A CHRONIC KIDNEY DISEASE (MULTI): ICD-10-CM

## 2025-07-24 DIAGNOSIS — J44.9 CHRONIC OBSTRUCTIVE PULMONARY DISEASE, UNSPECIFIED COPD TYPE (MULTI): ICD-10-CM

## 2025-07-24 DIAGNOSIS — E78.2 MIXED HYPERLIPIDEMIA: ICD-10-CM

## 2025-07-24 DIAGNOSIS — G47.33 OBSTRUCTIVE SLEEP APNEA SYNDROME: ICD-10-CM

## 2025-07-24 DIAGNOSIS — I12.9 HYPERTENSIVE KIDNEY DISEASE WITH STAGE 3A CHRONIC KIDNEY DISEASE (MULTI): ICD-10-CM

## 2025-07-24 DIAGNOSIS — I10 PRIMARY HYPERTENSION: ICD-10-CM

## 2025-07-24 DIAGNOSIS — E66.01 CLASS 2 SEVERE OBESITY WITH SERIOUS COMORBIDITY AND BODY MASS INDEX (BMI) OF 35.0 TO 35.9 IN ADULT, UNSPECIFIED OBESITY TYPE: ICD-10-CM

## 2025-07-24 DIAGNOSIS — E11.9 TYPE 2 DIABETES MELLITUS WITHOUT COMPLICATION, WITHOUT LONG-TERM CURRENT USE OF INSULIN: ICD-10-CM

## 2025-07-24 DIAGNOSIS — N40.1 BENIGN PROSTATIC HYPERPLASIA WITH LOWER URINARY TRACT SYMPTOMS, SYMPTOM DETAILS UNSPECIFIED: ICD-10-CM

## 2025-07-24 DIAGNOSIS — R01.1 HEART MURMUR: ICD-10-CM

## 2025-07-24 DIAGNOSIS — I77.810 ASCENDING AORTA DILATION: ICD-10-CM

## 2025-07-24 DIAGNOSIS — I35.0 NONRHEUMATIC AORTIC VALVE STENOSIS: ICD-10-CM

## 2025-07-24 DIAGNOSIS — Z00.00 MEDICARE ANNUAL WELLNESS VISIT, SUBSEQUENT: Primary | ICD-10-CM

## 2025-07-24 DIAGNOSIS — M1A.09X0 IDIOPATHIC CHRONIC GOUT OF MULTIPLE SITES WITHOUT TOPHUS: ICD-10-CM

## 2025-07-24 DIAGNOSIS — K21.9 GASTROESOPHAGEAL REFLUX DISEASE WITHOUT ESOPHAGITIS: ICD-10-CM

## 2025-07-24 DIAGNOSIS — E66.812 CLASS 2 SEVERE OBESITY WITH SERIOUS COMORBIDITY AND BODY MASS INDEX (BMI) OF 35.0 TO 35.9 IN ADULT, UNSPECIFIED OBESITY TYPE: ICD-10-CM

## 2025-07-24 DIAGNOSIS — E55.9 VITAMIN D DEFICIENCY: ICD-10-CM

## 2025-07-24 PROBLEM — R97.20 ABNORMAL PROSTATE SPECIFIC ANTIGEN (PSA): Status: ACTIVE | Noted: 2025-07-24

## 2025-07-24 PROBLEM — G89.29 CHRONIC THROAT PAIN: Status: ACTIVE | Noted: 2025-07-24

## 2025-07-24 PROBLEM — K21.00 GASTROESOPHAGEAL REFLUX DISEASE WITH ESOPHAGITIS: Status: ACTIVE | Noted: 2018-12-17

## 2025-07-24 PROBLEM — G47.10 HYPERSOMNIA: Status: ACTIVE | Noted: 2025-07-24

## 2025-07-24 PROBLEM — K62.1 RECTAL POLYP: Status: ACTIVE | Noted: 2018-12-17

## 2025-07-24 PROBLEM — E66.9 OBESITY WITH BODY MASS INDEX 30 OR GREATER: Status: ACTIVE | Noted: 2025-07-24

## 2025-07-24 PROBLEM — R07.0 CHRONIC THROAT PAIN: Status: ACTIVE | Noted: 2025-07-24

## 2025-07-24 PROCEDURE — 1159F MED LIST DOCD IN RCRD: CPT | Performed by: FAMILY MEDICINE

## 2025-07-24 PROCEDURE — 3075F SYST BP GE 130 - 139MM HG: CPT | Performed by: FAMILY MEDICINE

## 2025-07-24 PROCEDURE — 99397 PER PM REEVAL EST PAT 65+ YR: CPT | Performed by: FAMILY MEDICINE

## 2025-07-24 PROCEDURE — 1170F FXNL STATUS ASSESSED: CPT | Performed by: FAMILY MEDICINE

## 2025-07-24 PROCEDURE — 99214 OFFICE O/P EST MOD 30 MIN: CPT | Performed by: FAMILY MEDICINE

## 2025-07-24 PROCEDURE — G0439 PPPS, SUBSEQ VISIT: HCPCS | Performed by: FAMILY MEDICINE

## 2025-07-24 PROCEDURE — 1160F RVW MEDS BY RX/DR IN RCRD: CPT | Performed by: FAMILY MEDICINE

## 2025-07-24 PROCEDURE — 3078F DIAST BP <80 MM HG: CPT | Performed by: FAMILY MEDICINE

## 2025-07-24 RX ORDER — LISINOPRIL AND HYDROCHLOROTHIAZIDE 12.5; 2 MG/1; MG/1
1 TABLET ORAL DAILY
Qty: 100 TABLET | Refills: 0 | Status: SHIPPED | OUTPATIENT
Start: 2025-07-24 | End: 2025-11-01

## 2025-07-24 ASSESSMENT — ENCOUNTER SYMPTOMS
DEPRESSION: 0
OCCASIONAL FEELINGS OF UNSTEADINESS: 0
LOSS OF SENSATION IN FEET: 0

## 2025-07-24 ASSESSMENT — ACTIVITIES OF DAILY LIVING (ADL)
DOING_HOUSEWORK: INDEPENDENT
TAKING_MEDICATION: INDEPENDENT
BATHING: INDEPENDENT
MANAGING_FINANCES: NEEDS ASSISTANCE
GROCERY_SHOPPING: INDEPENDENT
DRESSING: INDEPENDENT

## 2025-07-31 ENCOUNTER — APPOINTMENT (OUTPATIENT)
Dept: UROLOGY | Facility: CLINIC | Age: 85
End: 2025-07-31
Payer: COMMERCIAL

## 2025-07-31 VITALS — HEART RATE: 76 BPM | SYSTOLIC BLOOD PRESSURE: 174 MMHG | DIASTOLIC BLOOD PRESSURE: 101 MMHG | TEMPERATURE: 97.4 F

## 2025-07-31 DIAGNOSIS — R31.0 GROSS HEMATURIA: ICD-10-CM

## 2025-07-31 DIAGNOSIS — R97.20 INCREASED PROSTATE SPECIFIC ANTIGEN (PSA) VELOCITY: ICD-10-CM

## 2025-07-31 PROCEDURE — 99213 OFFICE O/P EST LOW 20 MIN: CPT | Performed by: UROLOGY

## 2025-07-31 PROCEDURE — 1160F RVW MEDS BY RX/DR IN RCRD: CPT | Performed by: UROLOGY

## 2025-07-31 PROCEDURE — 1159F MED LIST DOCD IN RCRD: CPT | Performed by: UROLOGY

## 2025-07-31 PROCEDURE — 3077F SYST BP >= 140 MM HG: CPT | Performed by: UROLOGY

## 2025-07-31 PROCEDURE — 3080F DIAST BP >= 90 MM HG: CPT | Performed by: UROLOGY

## 2025-07-31 RX ORDER — FINASTERIDE 5 MG/1
5 TABLET, FILM COATED ORAL DAILY
Qty: 90 TABLET | Refills: 3 | Status: SHIPPED | OUTPATIENT
Start: 2025-07-31 | End: 2026-07-31

## 2025-07-31 NOTE — PROGRESS NOTES
PAST UROLOGICAL HISTORY:  Mr. Murray is an 85-year-old man with 220g prostate presenting with urinary symptoms including straining, hesitancy, weak stream, urgency, and gross hematuria. He was unable to undergo home nucleation due to prostate size and critical saline shortage at the time. He was not considered a good candidate for robotic simple prostatectomy due to age and comorbidities. He underwent bilateral PAE on 02/28/2025 and was discharged on ciprofloxacin.    Notes, images, and tests were independently reviewed and interpreted as noted herein.    HPI TODAY 07/31/2025:    Post-PAE Follow-up:  - First outpatient follow-up post bilateral PAE performed 02/28/2025.  - No pain reported currently.  - Still requires pushing to finish urination but no longer needs to push to start.  - Nocturia approximately q2h (4x nightly).  - Daytime urinary control maintained without urgency incontinence.  - Quality of life score 2/6 with patient expressing satisfaction.  - IPSS score approximately 13.    Hematuria:  - No current gross hematuria reported.  - Previous gross hematuria resolved post-PAE.    PAST MEDICAL HISTORY:  - Recent labs 07/2025: Cr 0.96, HbA1c 6.5    REVIEW OF SYSTEMS: A tailored review of systems was performed and all pertinent positives and negatives are listed in the HPI.    PHYSICAL EXAMINATION:  Gen: NAD  Pulm: No increased WOB on RA  Cards: WWP    ASSESSMENT/PLAN:    BPH s/p PAE (N40.1)  - Assessment: Good response to bilateral PAE with improved urinary symptoms and resolution of hematuria.  - Plan: Continue finasteride  - Plan: Annual follow-up  - Plan: Finasteride refill ordered  - Counseling: Finasteride prevents further prostate growth and reduces bleeding risk.

## 2025-07-31 NOTE — Clinical Note
He is doing well after PAE - still has obstructive symptoms but much improved and no further hematuria. Thanks all.

## 2025-08-14 ENCOUNTER — APPOINTMENT (OUTPATIENT)
Dept: OPHTHALMOLOGY | Facility: CLINIC | Age: 85
End: 2025-08-14
Payer: COMMERCIAL

## 2025-08-18 DIAGNOSIS — I10 PRIMARY HYPERTENSION: ICD-10-CM

## 2025-08-18 RX ORDER — BUDESONIDE AND FORMOTEROL FUMARATE DIHYDRATE 160; 4.5 UG/1; UG/1
2 AEROSOL RESPIRATORY (INHALATION)
Qty: 10.2 G | Refills: 0 | Status: SHIPPED | OUTPATIENT
Start: 2025-08-18

## 2025-08-18 RX ORDER — ALBUTEROL SULFATE 90 UG/1
2 INHALANT RESPIRATORY (INHALATION) EVERY 4 HOURS PRN
Qty: 18 G | Refills: 0 | Status: SHIPPED | OUTPATIENT
Start: 2025-08-18

## 2025-09-03 ENCOUNTER — APPOINTMENT (OUTPATIENT)
Dept: CARDIOLOGY | Facility: CLINIC | Age: 85
End: 2025-09-03
Payer: COMMERCIAL

## 2025-09-03 PROBLEM — Z71.2 ENCOUNTER TO DISCUSS TEST RESULTS: Status: ACTIVE | Noted: 2025-09-03

## 2025-09-03 PROBLEM — G47.33 OBSTRUCTIVE SLEEP APNEA: Status: ACTIVE | Noted: 2025-09-03

## 2025-09-03 PROBLEM — I77.810 DILATATION OF THORACIC AORTA: Status: ACTIVE | Noted: 2025-09-03

## 2025-09-04 ENCOUNTER — TELEPHONE (OUTPATIENT)
Dept: CARDIOLOGY | Facility: CLINIC | Age: 85
End: 2025-09-04
Payer: COMMERCIAL

## 2025-09-04 DIAGNOSIS — I35.0 MILD AORTIC STENOSIS: ICD-10-CM

## 2025-09-04 DIAGNOSIS — I77.810 DILATATION OF THORACIC AORTA: ICD-10-CM

## 2025-09-15 ENCOUNTER — APPOINTMENT (OUTPATIENT)
Dept: OPHTHALMOLOGY | Facility: CLINIC | Age: 85
End: 2025-09-15
Payer: COMMERCIAL

## 2025-11-05 ENCOUNTER — APPOINTMENT (OUTPATIENT)
Dept: PRIMARY CARE | Facility: CLINIC | Age: 85
End: 2025-11-05
Payer: COMMERCIAL

## 2026-07-30 ENCOUNTER — APPOINTMENT (OUTPATIENT)
Dept: UROLOGY | Facility: CLINIC | Age: 86
End: 2026-07-30
Payer: COMMERCIAL

## 2026-09-09 ENCOUNTER — APPOINTMENT (OUTPATIENT)
Dept: CARDIOLOGY | Facility: CLINIC | Age: 86
End: 2026-09-09
Payer: COMMERCIAL